# Patient Record
Sex: MALE | Race: OTHER | NOT HISPANIC OR LATINO | Employment: OTHER | URBAN - METROPOLITAN AREA
[De-identification: names, ages, dates, MRNs, and addresses within clinical notes are randomized per-mention and may not be internally consistent; named-entity substitution may affect disease eponyms.]

---

## 2023-05-30 LAB
ALANINE AMINOTRANSFERASE (SGPT) (U/L) IN SER/PLAS: 13 U/L (ref 10–52)
ALBUMIN (G/DL) IN SER/PLAS: 4.2 G/DL (ref 3.4–5)
ALKALINE PHOSPHATASE (U/L) IN SER/PLAS: 70 U/L (ref 33–136)
ANION GAP IN SER/PLAS: 13 MMOL/L (ref 10–20)
APPEARANCE, URINE: CLEAR
ASPARTATE AMINOTRANSFERASE (SGOT) (U/L) IN SER/PLAS: 14 U/L (ref 9–39)
BASOPHILS (10*3/UL) IN BLOOD BY AUTOMATED COUNT: 0.1 X10E9/L (ref 0–0.1)
BASOPHILS/100 LEUKOCYTES IN BLOOD BY AUTOMATED COUNT: 1.4 % (ref 0–2)
BILIRUBIN TOTAL (MG/DL) IN SER/PLAS: 0.4 MG/DL (ref 0–1.2)
BILIRUBIN, URINE: NEGATIVE
BLOOD, URINE: NEGATIVE
CALCIUM (MG/DL) IN SER/PLAS: 8.9 MG/DL (ref 8.6–10.3)
CARBON DIOXIDE, TOTAL (MMOL/L) IN SER/PLAS: 27 MMOL/L (ref 21–32)
CHLORIDE (MMOL/L) IN SER/PLAS: 101 MMOL/L (ref 98–107)
CHOLESTEROL (MG/DL) IN SER/PLAS: 190 MG/DL (ref 0–199)
CHOLESTEROL IN HDL (MG/DL) IN SER/PLAS: 44 MG/DL
CHOLESTEROL/HDL RATIO: 4.3
COLOR, URINE: YELLOW
CREATININE (MG/DL) IN SER/PLAS: 0.89 MG/DL (ref 0.5–1.3)
EOSINOPHILS (10*3/UL) IN BLOOD BY AUTOMATED COUNT: 0.49 X10E9/L (ref 0–0.4)
EOSINOPHILS/100 LEUKOCYTES IN BLOOD BY AUTOMATED COUNT: 6.7 % (ref 0–6)
ERYTHROCYTE DISTRIBUTION WIDTH (RATIO) BY AUTOMATED COUNT: 16.7 % (ref 11.5–14.5)
ERYTHROCYTE MEAN CORPUSCULAR HEMOGLOBIN CONCENTRATION (G/DL) BY AUTOMATED: 29.2 G/DL (ref 32–36)
ERYTHROCYTE MEAN CORPUSCULAR VOLUME (FL) BY AUTOMATED COUNT: 72 FL (ref 80–100)
ERYTHROCYTES (10*6/UL) IN BLOOD BY AUTOMATED COUNT: 5.77 X10E12/L (ref 4.5–5.9)
ESTIMATED AVERAGE GLUCOSE FOR HBA1C: 126 MG/DL
GFR MALE: 86 ML/MIN/1.73M2
GLUCOSE (MG/DL) IN SER/PLAS: 125 MG/DL (ref 74–99)
GLUCOSE, URINE: NEGATIVE MG/DL
HEMATOCRIT (%) IN BLOOD BY AUTOMATED COUNT: 41.4 % (ref 41–52)
HEMOGLOBIN (G/DL) IN BLOOD: 12.1 G/DL (ref 13.5–17.5)
HEMOGLOBIN A1C/HEMOGLOBIN TOTAL IN BLOOD: 6 %
IMMATURE GRANULOCYTES/100 LEUKOCYTES IN BLOOD BY AUTOMATED COUNT: 0.4 % (ref 0–0.9)
KETONES, URINE: NEGATIVE MG/DL
LDL: 100 MG/DL (ref 0–99)
LEUKOCYTE ESTERASE, URINE: NEGATIVE
LEUKOCYTES (10*3/UL) IN BLOOD BY AUTOMATED COUNT: 7.3 X10E9/L (ref 4.4–11.3)
LYMPHOCYTES (10*3/UL) IN BLOOD BY AUTOMATED COUNT: 2.12 X10E9/L (ref 0.8–3)
LYMPHOCYTES/100 LEUKOCYTES IN BLOOD BY AUTOMATED COUNT: 29 % (ref 13–44)
MONOCYTES (10*3/UL) IN BLOOD BY AUTOMATED COUNT: 1 X10E9/L (ref 0.05–0.8)
MONOCYTES/100 LEUKOCYTES IN BLOOD BY AUTOMATED COUNT: 13.7 % (ref 2–10)
NEUTROPHILS (10*3/UL) IN BLOOD BY AUTOMATED COUNT: 3.56 X10E9/L (ref 1.6–5.5)
NEUTROPHILS/100 LEUKOCYTES IN BLOOD BY AUTOMATED COUNT: 48.8 % (ref 40–80)
NITRITE, URINE: NEGATIVE
NON HDL CHOLESTEROL: 146 MG/DL
NRBC (PER 100 WBCS) BY AUTOMATED COUNT: 0 /100 WBC (ref 0–0)
PH, URINE: 6 (ref 5–8)
PLATELETS (10*3/UL) IN BLOOD AUTOMATED COUNT: 190 X10E9/L (ref 150–450)
POTASSIUM (MMOL/L) IN SER/PLAS: 4.2 MMOL/L (ref 3.5–5.3)
PROTEIN TOTAL: 7.1 G/DL (ref 6.4–8.2)
PROTEIN, URINE: NEGATIVE MG/DL
SODIUM (MMOL/L) IN SER/PLAS: 137 MMOL/L (ref 136–145)
SPECIFIC GRAVITY, URINE: 1.02 (ref 1–1.03)
THYROTROPIN (MIU/L) IN SER/PLAS BY DETECTION LIMIT <= 0.05 MIU/L: 4.93 MIU/L (ref 0.44–3.98)
TRIGLYCERIDE (MG/DL) IN SER/PLAS: 229 MG/DL (ref 0–149)
URATE (MG/DL) IN SER/PLAS: 5.4 MG/DL (ref 4–7.5)
UREA NITROGEN (MG/DL) IN SER/PLAS: 15 MG/DL (ref 6–23)
UROBILINOGEN, URINE: <2 MG/DL (ref 0–1.9)
VLDL: 46 MG/DL (ref 0–40)

## 2023-09-07 LAB
ALANINE AMINOTRANSFERASE (SGPT) (U/L) IN SER/PLAS: 12 U/L (ref 10–52)
ALBUMIN (G/DL) IN SER/PLAS: 4.5 G/DL (ref 3.4–5)
ALKALINE PHOSPHATASE (U/L) IN SER/PLAS: 73 U/L (ref 33–136)
ANION GAP IN SER/PLAS: 8 MMOL/L (ref 10–20)
ASPARTATE AMINOTRANSFERASE (SGOT) (U/L) IN SER/PLAS: 17 U/L (ref 9–39)
BASOPHILS (10*3/UL) IN BLOOD BY AUTOMATED COUNT: 0.08 X10E9/L (ref 0–0.1)
BASOPHILS/100 LEUKOCYTES IN BLOOD BY AUTOMATED COUNT: 0.9 % (ref 0–2)
BILIRUBIN TOTAL (MG/DL) IN SER/PLAS: 0.5 MG/DL (ref 0–1.2)
CALCIUM (MG/DL) IN SER/PLAS: 9.5 MG/DL (ref 8.6–10.3)
CARBON DIOXIDE, TOTAL (MMOL/L) IN SER/PLAS: 32 MMOL/L (ref 21–32)
CHLORIDE (MMOL/L) IN SER/PLAS: 100 MMOL/L (ref 98–107)
CREATININE (MG/DL) IN SER/PLAS: 1.04 MG/DL (ref 0.5–1.3)
EOSINOPHILS (10*3/UL) IN BLOOD BY AUTOMATED COUNT: 0.56 X10E9/L (ref 0–0.4)
EOSINOPHILS/100 LEUKOCYTES IN BLOOD BY AUTOMATED COUNT: 6.5 % (ref 0–6)
ERYTHROCYTE DISTRIBUTION WIDTH (RATIO) BY AUTOMATED COUNT: 15.6 % (ref 11.5–14.5)
ERYTHROCYTE MEAN CORPUSCULAR HEMOGLOBIN CONCENTRATION (G/DL) BY AUTOMATED: 31 G/DL (ref 32–36)
ERYTHROCYTE MEAN CORPUSCULAR VOLUME (FL) BY AUTOMATED COUNT: 70 FL (ref 80–100)
ERYTHROCYTES (10*6/UL) IN BLOOD BY AUTOMATED COUNT: 5.76 X10E12/L (ref 4.5–5.9)
GFR MALE: 72 ML/MIN/1.73M2
GLUCOSE (MG/DL) IN SER/PLAS: 91 MG/DL (ref 74–99)
HEMATOCRIT (%) IN BLOOD BY AUTOMATED COUNT: 40.6 % (ref 41–52)
HEMOGLOBIN (G/DL) IN BLOOD: 12.6 G/DL (ref 13.5–17.5)
IMMATURE GRANULOCYTES/100 LEUKOCYTES IN BLOOD BY AUTOMATED COUNT: 0.4 % (ref 0–0.9)
LEUKOCYTES (10*3/UL) IN BLOOD BY AUTOMATED COUNT: 8.6 X10E9/L (ref 4.4–11.3)
LYMPHOCYTES (10*3/UL) IN BLOOD BY AUTOMATED COUNT: 2.08 X10E9/L (ref 0.8–3)
LYMPHOCYTES/100 LEUKOCYTES IN BLOOD BY AUTOMATED COUNT: 24.3 % (ref 13–44)
MONOCYTES (10*3/UL) IN BLOOD BY AUTOMATED COUNT: 0.79 X10E9/L (ref 0.05–0.8)
MONOCYTES/100 LEUKOCYTES IN BLOOD BY AUTOMATED COUNT: 9.2 % (ref 2–10)
NEUTROPHILS (10*3/UL) IN BLOOD BY AUTOMATED COUNT: 5.02 X10E9/L (ref 1.6–5.5)
NEUTROPHILS/100 LEUKOCYTES IN BLOOD BY AUTOMATED COUNT: 58.7 % (ref 40–80)
NRBC (PER 100 WBCS) BY AUTOMATED COUNT: 0 /100 WBC (ref 0–0)
PLATELETS (10*3/UL) IN BLOOD AUTOMATED COUNT: 225 X10E9/L (ref 150–450)
POTASSIUM (MMOL/L) IN SER/PLAS: 4.6 MMOL/L (ref 3.5–5.3)
PROTEIN TOTAL: 7.7 G/DL (ref 6.4–8.2)
SODIUM (MMOL/L) IN SER/PLAS: 135 MMOL/L (ref 136–145)
UREA NITROGEN (MG/DL) IN SER/PLAS: 16 MG/DL (ref 6–23)

## 2023-12-05 ENCOUNTER — LAB (OUTPATIENT)
Dept: LAB | Facility: LAB | Age: 83
End: 2023-12-05
Payer: MEDICARE

## 2023-12-05 DIAGNOSIS — D64.9 ANEMIA, UNSPECIFIED: ICD-10-CM

## 2023-12-05 DIAGNOSIS — Z00.00 ENCOUNTER FOR GENERAL ADULT MEDICAL EXAMINATION WITHOUT ABNORMAL FINDINGS: ICD-10-CM

## 2023-12-05 DIAGNOSIS — E03.8 OTHER SPECIFIED HYPOTHYROIDISM: Primary | ICD-10-CM

## 2023-12-05 DIAGNOSIS — R73.9 HYPERGLYCEMIA, UNSPECIFIED: ICD-10-CM

## 2023-12-05 LAB
ALBUMIN SERPL BCP-MCNC: 4.1 G/DL (ref 3.4–5)
ALP SERPL-CCNC: 73 U/L (ref 33–136)
ALT SERPL W P-5'-P-CCNC: 11 U/L (ref 10–52)
ANION GAP SERPL CALC-SCNC: 12 MMOL/L (ref 10–20)
APPEARANCE UR: CLEAR
AST SERPL W P-5'-P-CCNC: 15 U/L (ref 9–39)
BASOPHILS # BLD AUTO: 0.08 X10*3/UL (ref 0–0.1)
BASOPHILS NFR BLD AUTO: 1.1 %
BILIRUB SERPL-MCNC: 0.5 MG/DL (ref 0–1.2)
BILIRUB UR STRIP.AUTO-MCNC: NEGATIVE MG/DL
BUN SERPL-MCNC: 14 MG/DL (ref 6–23)
CALCIUM SERPL-MCNC: 9.4 MG/DL (ref 8.6–10.3)
CHLORIDE SERPL-SCNC: 100 MMOL/L (ref 98–107)
CHOLEST SERPL-MCNC: 197 MG/DL (ref 0–199)
CHOLESTEROL/HDL RATIO: 4.8
CO2 SERPL-SCNC: 28 MMOL/L (ref 21–32)
COLOR UR: YELLOW
CREAT SERPL-MCNC: 1.14 MG/DL (ref 0.5–1.3)
EOSINOPHIL # BLD AUTO: 0.45 X10*3/UL (ref 0–0.4)
EOSINOPHIL NFR BLD AUTO: 6.4 %
ERYTHROCYTE [DISTWIDTH] IN BLOOD BY AUTOMATED COUNT: 15.8 % (ref 11.5–14.5)
EST. AVERAGE GLUCOSE BLD GHB EST-MCNC: 137 MG/DL
GFR SERPL CREATININE-BSD FRML MDRD: 65 ML/MIN/1.73M*2
GLUCOSE SERPL-MCNC: 113 MG/DL (ref 74–99)
GLUCOSE UR STRIP.AUTO-MCNC: NEGATIVE MG/DL
HBA1C MFR BLD: 6.4 %
HCT VFR BLD AUTO: 40.2 % (ref 41–52)
HDLC SERPL-MCNC: 40.9 MG/DL
HGB BLD-MCNC: 12.4 G/DL (ref 13.5–17.5)
IMM GRANULOCYTES # BLD AUTO: 0.03 X10*3/UL (ref 0–0.5)
IMM GRANULOCYTES NFR BLD AUTO: 0.4 % (ref 0–0.9)
KETONES UR STRIP.AUTO-MCNC: NEGATIVE MG/DL
LDLC SERPL CALC-MCNC: 120 MG/DL
LEUKOCYTE ESTERASE UR QL STRIP.AUTO: NEGATIVE
LYMPHOCYTES # BLD AUTO: 2.13 X10*3/UL (ref 0.8–3)
LYMPHOCYTES NFR BLD AUTO: 30.5 %
MCH RBC QN AUTO: 21.9 PG (ref 26–34)
MCHC RBC AUTO-ENTMCNC: 30.8 G/DL (ref 32–36)
MCV RBC AUTO: 71 FL (ref 80–100)
MONOCYTES # BLD AUTO: 0.88 X10*3/UL (ref 0.05–0.8)
MONOCYTES NFR BLD AUTO: 12.6 %
NEUTROPHILS # BLD AUTO: 3.42 X10*3/UL (ref 1.6–5.5)
NEUTROPHILS NFR BLD AUTO: 49 %
NITRITE UR QL STRIP.AUTO: NEGATIVE
NON HDL CHOLESTEROL: 156 MG/DL (ref 0–149)
NRBC BLD-RTO: 0 /100 WBCS (ref 0–0)
PH UR STRIP.AUTO: 6 [PH]
PLATELET # BLD AUTO: 207 X10*3/UL (ref 150–450)
POTASSIUM SERPL-SCNC: 4.4 MMOL/L (ref 3.5–5.3)
PROT SERPL-MCNC: 7 G/DL (ref 6.4–8.2)
PROT UR STRIP.AUTO-MCNC: NEGATIVE MG/DL
PSA SERPL-MCNC: 1.3 NG/ML
RBC # BLD AUTO: 5.67 X10*6/UL (ref 4.5–5.9)
RBC # UR STRIP.AUTO: NEGATIVE /UL
SODIUM SERPL-SCNC: 136 MMOL/L (ref 136–145)
SP GR UR STRIP.AUTO: 1.02
TRIGL SERPL-MCNC: 183 MG/DL (ref 0–149)
TSH SERPL-ACNC: 5.28 MIU/L (ref 0.44–3.98)
UROBILINOGEN UR STRIP.AUTO-MCNC: <2 MG/DL
VLDL: 37 MG/DL (ref 0–40)
WBC # BLD AUTO: 7 X10*3/UL (ref 4.4–11.3)

## 2023-12-05 PROCEDURE — 83036 HEMOGLOBIN GLYCOSYLATED A1C: CPT

## 2023-12-05 PROCEDURE — 80061 LIPID PANEL: CPT

## 2023-12-05 PROCEDURE — 36415 COLL VENOUS BLD VENIPUNCTURE: CPT

## 2023-12-05 PROCEDURE — 85025 COMPLETE CBC W/AUTO DIFF WBC: CPT

## 2023-12-05 PROCEDURE — 81003 URINALYSIS AUTO W/O SCOPE: CPT

## 2023-12-05 PROCEDURE — 80053 COMPREHEN METABOLIC PANEL: CPT

## 2023-12-05 PROCEDURE — 84443 ASSAY THYROID STIM HORMONE: CPT

## 2023-12-05 PROCEDURE — 84153 ASSAY OF PSA TOTAL: CPT

## 2024-02-26 ENCOUNTER — APPOINTMENT (OUTPATIENT)
Dept: RADIOLOGY | Facility: HOSPITAL | Age: 84
End: 2024-02-26
Payer: MEDICARE

## 2024-02-26 ENCOUNTER — HOSPITAL ENCOUNTER (OUTPATIENT)
Facility: HOSPITAL | Age: 84
Setting detail: OBSERVATION
Discharge: HOME | End: 2024-02-27
Attending: EMERGENCY MEDICINE
Payer: MEDICARE

## 2024-02-26 ENCOUNTER — APPOINTMENT (OUTPATIENT)
Dept: CARDIOLOGY | Facility: HOSPITAL | Age: 84
End: 2024-02-26
Payer: MEDICARE

## 2024-02-26 DIAGNOSIS — H81.22 VESTIBULAR NEURITIS, LEFT: ICD-10-CM

## 2024-02-26 DIAGNOSIS — H81.20 VESTIBULAR NEURONITIS, UNSPECIFIED LATERALITY: ICD-10-CM

## 2024-02-26 DIAGNOSIS — R42 DIZZINESS: Primary | ICD-10-CM

## 2024-02-26 LAB
ALBUMIN SERPL BCP-MCNC: 4.1 G/DL (ref 3.4–5)
ALP SERPL-CCNC: 62 U/L (ref 33–136)
ALT SERPL W P-5'-P-CCNC: 13 U/L (ref 10–52)
ANION GAP SERPL CALC-SCNC: 10 MMOL/L (ref 10–20)
AST SERPL W P-5'-P-CCNC: 16 U/L (ref 9–39)
BASOPHILS # BLD AUTO: 0.09 X10*3/UL (ref 0–0.1)
BASOPHILS NFR BLD AUTO: 0.8 %
BILIRUB SERPL-MCNC: 0.4 MG/DL (ref 0–1.2)
BUN SERPL-MCNC: 15 MG/DL (ref 6–23)
CALCIUM SERPL-MCNC: 8.9 MG/DL (ref 8.6–10.3)
CARDIAC TROPONIN I PNL SERPL HS: 3 NG/L (ref 0–20)
CHLORIDE SERPL-SCNC: 103 MMOL/L (ref 98–107)
CO2 SERPL-SCNC: 29 MMOL/L (ref 21–32)
CREAT SERPL-MCNC: 0.92 MG/DL (ref 0.5–1.3)
EGFRCR SERPLBLD CKD-EPI 2021: 84 ML/MIN/1.73M*2
EOSINOPHIL # BLD AUTO: 0.43 X10*3/UL (ref 0–0.4)
EOSINOPHIL NFR BLD AUTO: 3.7 %
ERYTHROCYTE [DISTWIDTH] IN BLOOD BY AUTOMATED COUNT: 16.1 % (ref 11.5–14.5)
GLUCOSE SERPL-MCNC: 136 MG/DL (ref 74–99)
HCT VFR BLD AUTO: 39.4 % (ref 41–52)
HGB BLD-MCNC: 12.4 G/DL (ref 13.5–17.5)
IMM GRANULOCYTES # BLD AUTO: 0.05 X10*3/UL (ref 0–0.5)
IMM GRANULOCYTES NFR BLD AUTO: 0.4 % (ref 0–0.9)
LYMPHOCYTES # BLD AUTO: 2.08 X10*3/UL (ref 0.8–3)
LYMPHOCYTES NFR BLD AUTO: 18.1 %
MCH RBC QN AUTO: 22.1 PG (ref 26–34)
MCHC RBC AUTO-ENTMCNC: 31.5 G/DL (ref 32–36)
MCV RBC AUTO: 70 FL (ref 80–100)
MONOCYTES # BLD AUTO: 0.92 X10*3/UL (ref 0.05–0.8)
MONOCYTES NFR BLD AUTO: 8 %
NEUTROPHILS # BLD AUTO: 7.92 X10*3/UL (ref 1.6–5.5)
NEUTROPHILS NFR BLD AUTO: 69 %
NRBC BLD-RTO: 0 /100 WBCS (ref 0–0)
PLATELET # BLD AUTO: 170 X10*3/UL (ref 150–450)
POTASSIUM SERPL-SCNC: 3.5 MMOL/L (ref 3.5–5.3)
PROT SERPL-MCNC: 6.9 G/DL (ref 6.4–8.2)
RBC # BLD AUTO: 5.6 X10*6/UL (ref 4.5–5.9)
SODIUM SERPL-SCNC: 138 MMOL/L (ref 136–145)
WBC # BLD AUTO: 11.5 X10*3/UL (ref 4.4–11.3)

## 2024-02-26 PROCEDURE — G0378 HOSPITAL OBSERVATION PER HR: HCPCS

## 2024-02-26 PROCEDURE — 97165 OT EVAL LOW COMPLEX 30 MIN: CPT | Mod: GO

## 2024-02-26 PROCEDURE — 2550000001 HC RX 255 CONTRASTS: Performed by: EMERGENCY MEDICINE

## 2024-02-26 PROCEDURE — 2500000001 HC RX 250 WO HCPCS SELF ADMINISTERED DRUGS (ALT 637 FOR MEDICARE OP)

## 2024-02-26 PROCEDURE — 2500000004 HC RX 250 GENERAL PHARMACY W/ HCPCS (ALT 636 FOR OP/ED)

## 2024-02-26 PROCEDURE — 84484 ASSAY OF TROPONIN QUANT: CPT | Performed by: PHYSICIAN ASSISTANT

## 2024-02-26 PROCEDURE — 70551 MRI BRAIN STEM W/O DYE: CPT | Performed by: RADIOLOGY

## 2024-02-26 PROCEDURE — 2500000001 HC RX 250 WO HCPCS SELF ADMINISTERED DRUGS (ALT 637 FOR MEDICARE OP): Performed by: INTERNAL MEDICINE

## 2024-02-26 PROCEDURE — 70498 CT ANGIOGRAPHY NECK: CPT | Performed by: RADIOLOGY

## 2024-02-26 PROCEDURE — 99222 1ST HOSP IP/OBS MODERATE 55: CPT

## 2024-02-26 PROCEDURE — 96361 HYDRATE IV INFUSION ADD-ON: CPT | Mod: 59 | Performed by: EMERGENCY MEDICINE

## 2024-02-26 PROCEDURE — 36415 COLL VENOUS BLD VENIPUNCTURE: CPT | Performed by: PHYSICIAN ASSISTANT

## 2024-02-26 PROCEDURE — 70450 CT HEAD/BRAIN W/O DYE: CPT

## 2024-02-26 PROCEDURE — 99223 1ST HOSP IP/OBS HIGH 75: CPT | Performed by: STUDENT IN AN ORGANIZED HEALTH CARE EDUCATION/TRAINING PROGRAM

## 2024-02-26 PROCEDURE — 70496 CT ANGIOGRAPHY HEAD: CPT | Performed by: RADIOLOGY

## 2024-02-26 PROCEDURE — 70551 MRI BRAIN STEM W/O DYE: CPT

## 2024-02-26 PROCEDURE — 99285 EMERGENCY DEPT VISIT HI MDM: CPT | Mod: 25

## 2024-02-26 PROCEDURE — 2500000004 HC RX 250 GENERAL PHARMACY W/ HCPCS (ALT 636 FOR OP/ED): Performed by: PHYSICIAN ASSISTANT

## 2024-02-26 PROCEDURE — 97161 PT EVAL LOW COMPLEX 20 MIN: CPT | Mod: GP

## 2024-02-26 PROCEDURE — 70498 CT ANGIOGRAPHY NECK: CPT

## 2024-02-26 PROCEDURE — 2500000002 HC RX 250 W HCPCS SELF ADMINISTERED DRUGS (ALT 637 FOR MEDICARE OP, ALT 636 FOR OP/ED): Mod: MUE | Performed by: INTERNAL MEDICINE

## 2024-02-26 PROCEDURE — 93005 ELECTROCARDIOGRAM TRACING: CPT

## 2024-02-26 PROCEDURE — 85025 COMPLETE CBC W/AUTO DIFF WBC: CPT | Performed by: PHYSICIAN ASSISTANT

## 2024-02-26 PROCEDURE — 80053 COMPREHEN METABOLIC PANEL: CPT | Performed by: PHYSICIAN ASSISTANT

## 2024-02-26 PROCEDURE — 96374 THER/PROPH/DIAG INJ IV PUSH: CPT | Mod: 59 | Performed by: EMERGENCY MEDICINE

## 2024-02-26 PROCEDURE — 96372 THER/PROPH/DIAG INJ SC/IM: CPT

## 2024-02-26 RX ORDER — ALLOPURINOL 100 MG/1
100 TABLET ORAL NIGHTLY
Status: DISCONTINUED | OUTPATIENT
Start: 2024-02-26 | End: 2024-02-27 | Stop reason: HOSPADM

## 2024-02-26 RX ORDER — LEVOTHYROXINE SODIUM 75 UG/1
75 TABLET ORAL
COMMUNITY
Start: 2003-10-29

## 2024-02-26 RX ORDER — ALBUTEROL SULFATE 90 UG/1
1 AEROSOL, METERED RESPIRATORY (INHALATION) EVERY 4 HOURS PRN
COMMUNITY
Start: 2023-12-11

## 2024-02-26 RX ORDER — PNV NO.95/FERROUS FUM/FOLIC AC 28MG-0.8MG
1000 TABLET ORAL DAILY
Status: DISCONTINUED | OUTPATIENT
Start: 2024-02-26 | End: 2024-02-27 | Stop reason: HOSPADM

## 2024-02-26 RX ORDER — DOCUSATE SODIUM 100 MG/1
100 CAPSULE, LIQUID FILLED ORAL 2 TIMES DAILY
Status: DISCONTINUED | OUTPATIENT
Start: 2024-02-26 | End: 2024-02-27 | Stop reason: HOSPADM

## 2024-02-26 RX ORDER — MONTELUKAST SODIUM 10 MG/1
10 TABLET ORAL NIGHTLY
COMMUNITY
Start: 2023-11-16

## 2024-02-26 RX ORDER — ASPIRIN 81 MG/1
81 TABLET ORAL DAILY
Status: DISCONTINUED | OUTPATIENT
Start: 2024-02-26 | End: 2024-02-27 | Stop reason: HOSPADM

## 2024-02-26 RX ORDER — MECLIZINE HYDROCHLORIDE 25 MG/1
25 TABLET ORAL ONCE
Status: COMPLETED | OUTPATIENT
Start: 2024-02-26 | End: 2024-02-26

## 2024-02-26 RX ORDER — ACETAMINOPHEN 325 MG/1
650 TABLET ORAL EVERY 4 HOURS PRN
Status: DISCONTINUED | OUTPATIENT
Start: 2024-02-26 | End: 2024-02-27 | Stop reason: HOSPADM

## 2024-02-26 RX ORDER — PRIMIDONE 50 MG/1
50 TABLET ORAL 2 TIMES DAILY
Status: DISCONTINUED | OUTPATIENT
Start: 2024-02-26 | End: 2024-02-27 | Stop reason: HOSPADM

## 2024-02-26 RX ORDER — PRIMIDONE 50 MG/1
1 TABLET ORAL 2 TIMES DAILY
COMMUNITY
Start: 2020-01-08

## 2024-02-26 RX ORDER — BISACODYL 5 MG
10 TABLET, DELAYED RELEASE (ENTERIC COATED) ORAL ONCE
Status: COMPLETED | OUTPATIENT
Start: 2024-02-26 | End: 2024-02-26

## 2024-02-26 RX ORDER — ENOXAPARIN SODIUM 100 MG/ML
40 INJECTION SUBCUTANEOUS EVERY 24 HOURS
Status: DISCONTINUED | OUTPATIENT
Start: 2024-02-26 | End: 2024-02-27 | Stop reason: HOSPADM

## 2024-02-26 RX ORDER — ICOSAPENT ETHYL 1 G/1
1000 CAPSULE ORAL DAILY
Status: DISCONTINUED | OUTPATIENT
Start: 2024-02-26 | End: 2024-02-27 | Stop reason: HOSPADM

## 2024-02-26 RX ORDER — CEPHALEXIN 250 MG/1
1 CAPSULE ORAL NIGHTLY
COMMUNITY
Start: 2003-12-23

## 2024-02-26 RX ORDER — MAGNESIUM 200 MG
TABLET ORAL DAILY
COMMUNITY

## 2024-02-26 RX ORDER — DIAZEPAM 5 MG/ML
2 INJECTION, SOLUTION INTRAMUSCULAR; INTRAVENOUS ONCE
Status: COMPLETED | OUTPATIENT
Start: 2024-02-26 | End: 2024-02-26

## 2024-02-26 RX ORDER — CHOLECALCIFEROL (VITAMIN D3) 25 MCG
1000 TABLET ORAL DAILY
Status: DISCONTINUED | OUTPATIENT
Start: 2024-02-26 | End: 2024-02-27 | Stop reason: HOSPADM

## 2024-02-26 RX ORDER — FLUTICASONE FUROATE AND VILANTEROL 100; 25 UG/1; UG/1
1 POWDER RESPIRATORY (INHALATION)
Status: DISCONTINUED | OUTPATIENT
Start: 2024-02-27 | End: 2024-02-27 | Stop reason: HOSPADM

## 2024-02-26 RX ORDER — MULTIVIT-MIN/IRON FUM/FOLIC AC 7.5 MG-4
1 TABLET ORAL DAILY
Status: DISCONTINUED | OUTPATIENT
Start: 2024-02-26 | End: 2024-02-27 | Stop reason: HOSPADM

## 2024-02-26 RX ORDER — MECLIZINE HYDROCHLORIDE 25 MG/1
25 TABLET ORAL 3 TIMES DAILY PRN
Status: DISCONTINUED | OUTPATIENT
Start: 2024-02-26 | End: 2024-02-27 | Stop reason: HOSPADM

## 2024-02-26 RX ORDER — ATORVASTATIN CALCIUM 20 MG/1
20 TABLET, FILM COATED ORAL NIGHTLY
Status: DISCONTINUED | OUTPATIENT
Start: 2024-02-26 | End: 2024-02-27 | Stop reason: HOSPADM

## 2024-02-26 RX ORDER — ALLOPURINOL 100 MG/1
100 TABLET ORAL NIGHTLY
COMMUNITY
Start: 2016-04-02

## 2024-02-26 RX ORDER — VIT C/E/ZN/COPPR/LUTEIN/ZEAXAN 250MG-90MG
50 CAPSULE ORAL DAILY
COMMUNITY

## 2024-02-26 RX ORDER — LEVOTHYROXINE SODIUM 150 UG/1
150 TABLET ORAL
Status: DISCONTINUED | OUTPATIENT
Start: 2024-03-02 | End: 2024-02-27 | Stop reason: HOSPADM

## 2024-02-26 RX ORDER — ALBUTEROL SULFATE 90 UG/1
1 AEROSOL, METERED RESPIRATORY (INHALATION) EVERY 4 HOURS PRN
Status: DISCONTINUED | OUTPATIENT
Start: 2024-02-26 | End: 2024-02-27 | Stop reason: HOSPADM

## 2024-02-26 RX ORDER — AMLODIPINE BESYLATE 5 MG/1
5 TABLET ORAL DAILY
Status: DISCONTINUED | OUTPATIENT
Start: 2024-02-26 | End: 2024-02-27 | Stop reason: HOSPADM

## 2024-02-26 RX ORDER — ACETAMINOPHEN 500 MG
1000 TABLET ORAL DAILY
COMMUNITY

## 2024-02-26 RX ORDER — MONTELUKAST SODIUM 10 MG/1
10 TABLET ORAL NIGHTLY
Status: DISCONTINUED | OUTPATIENT
Start: 2024-02-26 | End: 2024-02-27 | Stop reason: HOSPADM

## 2024-02-26 RX ORDER — POLYETHYLENE GLYCOL 3350 17 G/17G
17 POWDER, FOR SOLUTION ORAL DAILY
Status: DISCONTINUED | OUTPATIENT
Start: 2024-02-26 | End: 2024-02-27 | Stop reason: HOSPADM

## 2024-02-26 RX ORDER — LEVOTHYROXINE SODIUM 75 UG/1
75 TABLET ORAL
Status: DISCONTINUED | OUTPATIENT
Start: 2024-02-27 | End: 2024-02-27 | Stop reason: HOSPADM

## 2024-02-26 RX ORDER — MULTIVITAMIN
1 TABLET ORAL DAILY
COMMUNITY

## 2024-02-26 RX ADMIN — PRIMIDONE 50 MG: 50 TABLET ORAL at 21:18

## 2024-02-26 RX ADMIN — DOCUSATE SODIUM 100 MG: 100 CAPSULE, LIQUID FILLED ORAL at 09:48

## 2024-02-26 RX ADMIN — ALLOPURINOL 100 MG: 100 TABLET ORAL at 21:18

## 2024-02-26 RX ADMIN — POLYETHYLENE GLYCOL 3350 17 G: 17 POWDER, FOR SOLUTION ORAL at 09:48

## 2024-02-26 RX ADMIN — BISACODYL 10 MG: 5 TABLET, COATED ORAL at 05:20

## 2024-02-26 RX ADMIN — ENOXAPARIN SODIUM 40 MG: 40 INJECTION SUBCUTANEOUS at 05:20

## 2024-02-26 RX ADMIN — AMLODIPINE BESYLATE 5 MG: 5 TABLET ORAL at 21:17

## 2024-02-26 RX ADMIN — DIAZEPAM 2 MG: 10 INJECTION, SOLUTION INTRAMUSCULAR; INTRAVENOUS at 01:57

## 2024-02-26 RX ADMIN — MECLIZINE HYDROCHLORIDE 25 MG: 25 TABLET ORAL at 21:31

## 2024-02-26 RX ADMIN — MECLIZINE HYDROCHLORIDE 25 MG: 25 TABLET ORAL at 05:20

## 2024-02-26 RX ADMIN — IOHEXOL 90 ML: 350 INJECTION, SOLUTION INTRAVENOUS at 02:49

## 2024-02-26 RX ADMIN — ATORVASTATIN CALCIUM 20 MG: 20 TABLET, FILM COATED ORAL at 21:18

## 2024-02-26 RX ADMIN — SODIUM CHLORIDE 500 ML: 9 INJECTION, SOLUTION INTRAVENOUS at 01:57

## 2024-02-26 SDOH — SOCIAL STABILITY: SOCIAL INSECURITY: ARE THERE ANY APPARENT SIGNS OF INJURIES/BEHAVIORS THAT COULD BE RELATED TO ABUSE/NEGLECT?: NO

## 2024-02-26 SDOH — SOCIAL STABILITY: SOCIAL INSECURITY: DO YOU FEEL ANYONE HAS EXPLOITED OR TAKEN ADVANTAGE OF YOU FINANCIALLY OR OF YOUR PERSONAL PROPERTY?: NO

## 2024-02-26 SDOH — SOCIAL STABILITY: SOCIAL INSECURITY: WERE YOU ABLE TO COMPLETE ALL THE BEHAVIORAL HEALTH SCREENINGS?: YES

## 2024-02-26 SDOH — SOCIAL STABILITY: SOCIAL INSECURITY: ARE YOU OR HAVE YOU BEEN THREATENED OR ABUSED PHYSICALLY, EMOTIONALLY, OR SEXUALLY BY ANYONE?: NO

## 2024-02-26 SDOH — SOCIAL STABILITY: SOCIAL INSECURITY: HAVE YOU HAD THOUGHTS OF HARMING ANYONE ELSE?: NO

## 2024-02-26 SDOH — SOCIAL STABILITY: SOCIAL INSECURITY: HAS ANYONE EVER THREATENED TO HURT YOUR FAMILY OR YOUR PETS?: NO

## 2024-02-26 SDOH — SOCIAL STABILITY: SOCIAL INSECURITY: DO YOU FEEL UNSAFE GOING BACK TO THE PLACE WHERE YOU ARE LIVING?: NO

## 2024-02-26 SDOH — SOCIAL STABILITY: SOCIAL INSECURITY: DOES ANYONE TRY TO KEEP YOU FROM HAVING/CONTACTING OTHER FRIENDS OR DOING THINGS OUTSIDE YOUR HOME?: NO

## 2024-02-26 SDOH — SOCIAL STABILITY: SOCIAL INSECURITY: ABUSE: ADULT

## 2024-02-26 ASSESSMENT — ACTIVITIES OF DAILY LIVING (ADL)
BATHING: INDEPENDENT
PATIENT'S MEMORY ADEQUATE TO SAFELY COMPLETE DAILY ACTIVITIES?: YES
HEARING - LEFT EAR: FUNCTIONAL
HEARING - RIGHT EAR: FUNCTIONAL
FEEDING YOURSELF: INDEPENDENT
ADEQUATE_TO_COMPLETE_ADL: YES
JUDGMENT_ADEQUATE_SAFELY_COMPLETE_DAILY_ACTIVITIES: YES
WALKS IN HOME: INDEPENDENT
LACK_OF_TRANSPORTATION: NO
DRESSING YOURSELF: INDEPENDENT
GROOMING: INDEPENDENT
TOILETING: INDEPENDENT

## 2024-02-26 ASSESSMENT — COGNITIVE AND FUNCTIONAL STATUS - GENERAL
CLIMB 3 TO 5 STEPS WITH RAILING: A LITTLE
WALKING IN HOSPITAL ROOM: A LITTLE
PERSONAL GROOMING: A LITTLE
PATIENT BASELINE BEDBOUND: NO
TOILETING: A LOT
CLIMB 3 TO 5 STEPS WITH RAILING: A LITTLE
STANDING UP FROM CHAIR USING ARMS: A LITTLE
DAILY ACTIVITIY SCORE: 22
STANDING UP FROM CHAIR USING ARMS: A LITTLE
HELP NEEDED FOR BATHING: A LITTLE
MOVING TO AND FROM BED TO CHAIR: A LITTLE
DAILY ACTIVITIY SCORE: 17
MOBILITY SCORE: 20
WALKING IN HOSPITAL ROOM: A LITTLE
HELP NEEDED FOR BATHING: A LOT
MOVING TO AND FROM BED TO CHAIR: A LITTLE
TOILETING: A LITTLE
MOBILITY SCORE: 20
DRESSING REGULAR LOWER BODY CLOTHING: A LOT

## 2024-02-26 ASSESSMENT — COLUMBIA-SUICIDE SEVERITY RATING SCALE - C-SSRS

## 2024-02-26 ASSESSMENT — LIFESTYLE VARIABLES
PRESCIPTION_ABUSE_PAST_12_MONTHS: NO
AUDIT-C TOTAL SCORE: 0
HOW OFTEN DO YOU HAVE A DRINK CONTAINING ALCOHOL: NEVER
AUDIT-C TOTAL SCORE: 0
HAVE PEOPLE ANNOYED YOU BY CRITICIZING YOUR DRINKING: NO
EVER FELT BAD OR GUILTY ABOUT YOUR DRINKING: NO
HOW MANY STANDARD DRINKS CONTAINING ALCOHOL DO YOU HAVE ON A TYPICAL DAY: PATIENT DOES NOT DRINK
HAVE YOU EVER FELT YOU SHOULD CUT DOWN ON YOUR DRINKING: NO
SKIP TO QUESTIONS 9-10: 1
HOW OFTEN DO YOU HAVE 6 OR MORE DRINKS ON ONE OCCASION: NEVER
EVER HAD A DRINK FIRST THING IN THE MORNING TO STEADY YOUR NERVES TO GET RID OF A HANGOVER: NO
SUBSTANCE_ABUSE_PAST_12_MONTHS: NO

## 2024-02-26 ASSESSMENT — PAIN - FUNCTIONAL ASSESSMENT
PAIN_FUNCTIONAL_ASSESSMENT: 0-10

## 2024-02-26 ASSESSMENT — PAIN SCALES - GENERAL
PAINLEVEL_OUTOF10: 0 - NO PAIN

## 2024-02-26 ASSESSMENT — PATIENT HEALTH QUESTIONNAIRE - PHQ9
SUM OF ALL RESPONSES TO PHQ9 QUESTIONS 1 & 2: 0
2. FEELING DOWN, DEPRESSED OR HOPELESS: NOT AT ALL
1. LITTLE INTEREST OR PLEASURE IN DOING THINGS: NOT AT ALL

## 2024-02-26 NOTE — H&P
History Of Present Illness patient seen and examined by me.  Appreciate neurology consultation and NP's admission notes and orders reviewed and noted.  Case discussed with the nursing room 1 on the floor.  Camryn Garcia is a 83 y.o. male with past medical history of essential tremors, DEEPALI,/microcytic anemia/thalassemia minor/hypothyroidism, hxgout, andhx bilateral knee replacements r, who presented to [facility] ED today via EMS from home with dizziness. The patient reported a sudden onset of dizziness around 2345 last evening when making his nighttime snack. The patient states a sudden onset of a spinning sensation which he described as vertigo and had to hold onto the wall and sit down.  Patient states that he had a bout of emesis when he had the dizziness at home.  He states he has never had this before. He reports that the sensation continues despite lying stationary. No reports of fall or injury. Denies headache. States he has some new blurry vision. He states that shortly after the onset he had few episodes of vomiting. States he still feels dizzy on admission. He denies chest pain or shortness of breath. No reports of pain weakness or paresthesias to his extremities .  Speech is normal.  Patient is ambulating with a cane in the room and states that he still has dizziness but is improving since admission and he feels a spinning sensation when he moves his head or gets up from laying to standing position.  Blood pressure since admission has been borderline elevated with systolic in the 150s to 160s) blood pressure recently checked was 155/89 and pulse is 79.  Patient also had mild bradycardia on admission.  Patient denies any headaches.  He denies any sinus drainage or ear pain.  Patient has been seen by neurology and he had a CT scan brain and CTA of the neck done on admission which did not show any acute infarcts or bleeds or critical carotid stenosis.  MRI of the brain was done earlier and results will be  reviewed.  Patient has had no further nausea or vomiting.    ED course: Hemodynamically stable. Afebrile. /88, HR 57, RR 18, 96% RA.  EKG unavailable for my review. Labs: WBC 11.5, neutrophils 7.92. H&H 12.4/39.4. Glucose 136. Troponin 3. See imaging results below. Valium and IVF given in ED. NIH-0 in ED. Pt was admitted with above symptoms under observation on telemetry.     Past Medical History  Past Medical History:   Diagnosis Date    Iron deficiency anemia, unspecified     Microcytic anemia    Personal history of other diseases of the musculoskeletal system and connective tissue     History of gout    Personal history of other diseases of the respiratory system     History of bronchitis    Thalassemia minor     Beta thalassemia minor   ./History of hypothyroidism  ./History of bilateral knee replacements    Surgical History  Past Surgical History:   Procedure Laterality Date    OTHER SURGICAL HISTORY  12/10/2021    Ventral hernia repair    OTHER SURGICAL HISTORY  12/10/2021    Knee surgery        Social History  Former smoker. Denies alcohol or drug use. Lives with wife. Ambulates independently.  Patient lives at home with his wife.  He drives  Patient drinks occasionally.  Denies any heavy alcohol use.  Family History  Family history reviewed and updated no changes.     Allergies  Patient has no known allergies.    Review of Systems   Constitutional:         EXCEPT FOR HPI   All other systems reviewed and are negative.     10 point review of symptoms was performed and is negative except for what is stated in the HPI above.  Physical Exam  Constitutional:       Appearance: Normal appearance.   HENT:      Head: Normocephalic and atraumatic.      Nose: Nose normal.      Mouth/Throat:      Mouth: Mucous membranes are moist.      Pharynx: Oropharynx is clear.   Eyes:      Extraocular Movements: Extraocular movements intact.      Conjunctiva/sclera: Conjunctivae normal.      Pupils: Pupils are equal, round,  "and reactive to light.      Comments: Nystagmus noted bilaterally   Cardiovascular:      Rate and Rhythm: Normal rate and regular rhythm.      Pulses: Normal pulses.      Heart sounds: Normal heart sounds.   Pulmonary:      Effort: Pulmonary effort is normal.      Breath sounds: Normal breath sounds.   Abdominal:      General: Abdomen is flat. Bowel sounds are normal.      Palpations: Abdomen is soft.   Musculoskeletal:         General: Normal range of motion.      Cervical back: Normal range of motion and neck supple.   Skin:     General: Skin is warm and dry.      Capillary Refill: Capillary refill takes less than 2 seconds.   Neurological:      General: No focal deficit present.      Mental Status: He is alert and oriented to person, place, and time.   Psychiatric:         Mood and Affect: Mood normal.         Behavior: Behavior normal.         Thought Content: Thought content normal.         Judgment: Judgment normal.          Last Recorded Vitals  Blood pressure (!) 165/96, pulse 86, temperature 36.1 °C (97 °F), resp. rate 16, height 1.753 m (5' 9\"), weight 72.6 kg (160 lb), SpO2 95 %.  Repeat blood pressure this evening is 155/89.  Heart rate is 79.  Relevant Results  Results for orders placed or performed during the hospital encounter of 02/26/24 (from the past 24 hour(s))   CBC and Auto Differential   Result Value Ref Range    WBC 11.5 (H) 4.4 - 11.3 x10*3/uL    nRBC 0.0 0.0 - 0.0 /100 WBCs    RBC 5.60 4.50 - 5.90 x10*6/uL    Hemoglobin 12.4 (L) 13.5 - 17.5 g/dL    Hematocrit 39.4 (L) 41.0 - 52.0 %    MCV 70 (L) 80 - 100 fL    MCH 22.1 (L) 26.0 - 34.0 pg    MCHC 31.5 (L) 32.0 - 36.0 g/dL    RDW 16.1 (H) 11.5 - 14.5 %    Platelets 170 150 - 450 x10*3/uL    Neutrophils % 69.0 40.0 - 80.0 %    Immature Granulocytes %, Automated 0.4 0.0 - 0.9 %    Lymphocytes % 18.1 13.0 - 44.0 %    Monocytes % 8.0 2.0 - 10.0 %    Eosinophils % 3.7 0.0 - 6.0 %    Basophils % 0.8 0.0 - 2.0 %    Neutrophils Absolute 7.92 (H) 1.60 " - 5.50 x10*3/uL    Immature Granulocytes Absolute, Automated 0.05 0.00 - 0.50 x10*3/uL    Lymphocytes Absolute 2.08 0.80 - 3.00 x10*3/uL    Monocytes Absolute 0.92 (H) 0.05 - 0.80 x10*3/uL    Eosinophils Absolute 0.43 (H) 0.00 - 0.40 x10*3/uL    Basophils Absolute 0.09 0.00 - 0.10 x10*3/uL   Comprehensive metabolic panel   Result Value Ref Range    Glucose 136 (H) 74 - 99 mg/dL    Sodium 138 136 - 145 mmol/L    Potassium 3.5 3.5 - 5.3 mmol/L    Chloride 103 98 - 107 mmol/L    Bicarbonate 29 21 - 32 mmol/L    Anion Gap 10 10 - 20 mmol/L    Urea Nitrogen 15 6 - 23 mg/dL    Creatinine 0.92 0.50 - 1.30 mg/dL    eGFR 84 >60 mL/min/1.73m*2    Calcium 8.9 8.6 - 10.3 mg/dL    Albumin 4.1 3.4 - 5.0 g/dL    Alkaline Phosphatase 62 33 - 136 U/L    Total Protein 6.9 6.4 - 8.2 g/dL    AST 16 9 - 39 U/L    Bilirubin, Total 0.4 0.0 - 1.2 mg/dL    ALT 13 10 - 52 U/L   Troponin I, High Sensitivity   Result Value Ref Range    Troponin I, High Sensitivity 3 0 - 20 ng/L     CT angio head and neck w and wo IV contrast    Result Date: 2/26/2024  Interpreted By:  Marychuy Perez, STUDY: CT HEAD WO IV CONTRAST; CT ANGIO HEAD AND NECK W AND WO IV CONTRAST; 2/26/2024 2:49 am   INDICATION: Signs/Symptoms:dizzy   COMPARISON: Noncontrast head CT 03/04/2022   ACCESSION NUMBER(S): [id number]; [id number]   ORDERING CLINICIAN: SIMÓN ELLIOTT   TECHNIQUE: Multiple contiguous axial noncontrast images of the head were obtained. Following IV contrast administration, a CT angiography of the head  and neck was performed.  Triplanar MIPS  and 3D reconstructions of the [place]  and neck were generated.   FINDINGS: NON-CONTRAST HEAD CT:   No acute intracranial hemorrhage, mass-effect, evidence of large vessel ischemic infarct, or extra-axial fluid collection. Gray-white matter distinction is preserved.   No mass-effect, midline shift, sulcal effacement, or effacement of the basal cisterns.   The brain parenchymal volume and ventricles are  age-appropriate. No hydrocephalus.   No acute skull fracture. The visualized orbits  are intact. The visualized paranasal sinuses show  chronic opacification of the right frontal sinus and mucosal thickening in the ethmoid air cells. Mild mucosal thickening in the maxillary sinuses. The mastoids are clear.     CTA NECK:   The great vessel origins are patent with no significant stenosis.   LEFT VERTEBRAL ARTERY:  No hemodynamically significant stenosis, occlusion, or dissection.   LEFT COMMON/INTERNAL CAROTID ARTERY:  Mild calcification of the proximal internal carotid artery. Noncalcified plaque extending a length of approximately 1.7 cm in the proximal internal carotid artery resulting in 35% stenosis.   RIGHT VERTEBRAL ARTERY:  No hemodynamically significant stenosis, occlusion, or dissection.   RIGHT COMMON/INTERNAL CAROTID ARTERY: Mild calcification of the proximal internal carotid artery. No hemodynamically significant stenosis, occlusion, or dissection.     The neck soft tissues show no evidence of mass, fluid collection, or enlarged lymph nodes.   There is no acute osseous abnormality. The imaged lungs are clear. Mild-to-moderate cervical degenerative disc changes.     CTA HEAD:   ANTERIOR CIRCULATION: No hemodynamically significant intracranial stenosis, occlusion, or aneurysm.   - Internal Carotid Arteries: Patent with no hemodynamically significant stenosis.   - Middle Cerebral Arteries: Patent with no hemodynamically significant stenosis.   - Anterior Cerebral Arteries: Patent with no hemodynamically significant stenosis.     POSTERIOR CIRCULATION: No hemodynamically significant intracranial stenosis, occlusion, or aneurysm.   - Intracranial Vertebral Arteries: Patent with no hemodynamically significant stenosis.   - Basilar Artery: Patent with no hemodynamically significant stenosis.   - Posterior Cerebral Arteries: Patent with no hemodynamically significant stenosis.   No arteriovenous malformation is  visualized. No pathologic intracranial enhancement or discrete mass. The dural venous sinuses are patent.   MIPS and 3D reconstructions confirm the above findings.       1. No intracranial major arterial branch occlusion or hemodynamically significant stenosis.   2. Noncalcified plaque in the proximal left internal carotid artery resulting in 35% stenosis. No significant stenosis of the right carotid or vertebral arteries.   3. No acute intracranial abnormality.     MACRO: None   Signed by: Marychuy Perez 2/26/2024 3:49 AM Dictation workstation:   [id number]    CT head wo IV contrast    Result Date: 2/26/2024  Interpreted By:  Marychuy Perez, STUDY: CT HEAD WO IV CONTRAST; CT ANGIO HEAD AND NECK W AND WO IV CONTRAST; 2/26/2024 2:49 am   INDICATION: Signs/Symptoms:dizzy   COMPARISON: Noncontrast head CT 03/04/2022   ACCESSION NUMBER(S): [id number]; [id number]   ORDERING CLINICIAN: SIMÓN ELLIOTT   TECHNIQUE: Multiple contiguous axial noncontrast images of the head were obtained. Following IV contrast administration, a CT angiography of the head  and neck was performed.  Triplanar MIPS  and 3D reconstructions of the [place]  and neck were generated.   FINDINGS: NON-CONTRAST HEAD CT:   No acute intracranial hemorrhage, mass-effect, evidence of large vessel ischemic infarct, or extra-axial fluid collection. Gray-white matter distinction is preserved.   No mass-effect, midline shift, sulcal effacement, or effacement of the basal cisterns.   The brain parenchymal volume and ventricles are age-appropriate. No hydrocephalus.   No acute skull fracture. The visualized orbits  are intact. The visualized paranasal sinuses show  chronic opacification of the right frontal sinus and mucosal thickening in the ethmoid air cells. Mild mucosal thickening in the maxillary sinuses. The mastoids are clear.     CTA NECK:   The great vessel origins are patent with no significant stenosis.   LEFT VERTEBRAL ARTERY:  No  hemodynamically significant stenosis, occlusion, or dissection.   LEFT COMMON/INTERNAL CAROTID ARTERY:  Mild calcification of the proximal internal carotid artery. Noncalcified plaque extending a length of approximately 1.7 cm in the proximal internal carotid artery resulting in 35% stenosis.   RIGHT VERTEBRAL ARTERY:  No hemodynamically significant stenosis, occlusion, or dissection.   RIGHT COMMON/INTERNAL CAROTID ARTERY: Mild calcification of the proximal internal carotid artery. No hemodynamically significant stenosis, occlusion, or dissection.     The neck soft tissues show no evidence of mass, fluid collection, or enlarged lymph nodes.   There is no acute osseous abnormality. The imaged lungs are clear. Mild-to-moderate cervical degenerative disc changes.     CTA HEAD:   ANTERIOR CIRCULATION: No hemodynamically significant intracranial stenosis, occlusion, or aneurysm.   - Internal Carotid Arteries: Patent with no hemodynamically significant stenosis.   - Middle Cerebral Arteries: Patent with no hemodynamically significant stenosis.   - Anterior Cerebral Arteries: Patent with no hemodynamically significant stenosis.     POSTERIOR CIRCULATION: No hemodynamically significant intracranial stenosis, occlusion, or aneurysm.   - Intracranial Vertebral Arteries: Patent with no hemodynamically significant stenosis.   - Basilar Artery: Patent with no hemodynamically significant stenosis.   - Posterior Cerebral Arteries: Patent with no hemodynamically significant stenosis.   No arteriovenous malformation is visualized. No pathologic intracranial enhancement or discrete mass. The dural venous sinuses are patent.   MIPS and 3D reconstructions confirm the above findings.       1. No intracranial major arterial branch occlusion or hemodynamically significant stenosis.   2. Noncalcified plaque in the proximal left internal carotid artery resulting in 35% stenosis. No significant stenosis of the right carotid or vertebral  arteries.   3. No acute intracranial abnormality.     MACRO: None   Signed by: Marychuy Perez 2/26/2024 3:49 AM Dictation workstation:   [id number]        Assessment/Plan   Principal Problem:    Dizziness    83 year old male with past medical history of essential tremors, DEEPALI, gout, and thalassemia minor, hypothyroidism who presented to [facility] ED today via EMS from home with dizziness. The patient reported a sudden onset of dizziness around 2345 last evening when making his nighttime snack. The patient states a sudden onset of a spinning sensation which he described as vertigo and had to hold onto the wall and sit down. He states he has never had this before. He reports that the sensation continues despite lying stationary. Neurology consulted. Patient will be admitted to the hospital for further medical management.    #1#Dizziness/Bpv vs r/o Cva /CT scan of the brain on admission is negative for any acute infarcts or bleeds.  CT of the neck shows noncalcified plaque in the left carotid artery/35% stenosis and no critical stenosis.  MRI of the brain without IV contrast noted..  Ventricles and sulci and basal cisterns are diffusely prominent with moderate parenchymal volume loss.  No acute bleeds or acute infarct seen.  Chronic small vessel ischemic changes seen and regions of increased T2 signal within the bilateral basal ganglia may represent prominent perivascular spaces versus chronic lacunar infarcts.  No midline shift or mass effect seen.  The right lens has been surgically replaced.  Mucosal thickening is noted throughout the paranasal sinuses.  Patient denies any facial pain or sinus issues.  Patient has been started on meclizine and his dizziness is improving.  Will have neurology review the MRI in AM.  Patient will be started on aspirin 81 mg p.o. daily.  Also will consider starting him on a statin treatment and check lipids in AM.  2./Elevated blood pressure/uncontrolled/systolic blood pressure since  admission is running in the 160s to 150s.  Patient has no history of hypertension.  He states he is anxious of his vertigo.  Will continue to monitor the blood pressure and if it stays above 150 by a.m. will start him on amlodipine 5 mg p.o. daily.  #3#Bradycardia, intermittent/bradycardia has improved and his current heart rate is 79.  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  5./Hypothyroidism patient will be written resumed on his levothyroxine as per home dosage.  6./Mild microcytic anemia and iron deficiency anemia and history of thalassemia minor. /Mild leukocytosis WBC count on admission was 11.1 and hemoglobin stable at 12.4 and hematocrit is 39.4.  Admit to OBS/telemetry  Neurology consult and appreciate recs  See imaging results  Valium given in ED with no improvement in dizziness  Antivert ordered  IVF given in ED  Neuro checks  PT/OT  UA ordered  Repeat labs in morning  Patient is clinically improving.  #Acute on chronic anemia (DEEPALI)  H&H 12.4/39.4  Continue to monitor  ./Elevated blood pressure/systolic blood pressures in the 160s.  Patient has no history of hypertension.  Will continue to monitor and advise no added salt diet.  Will consider low-dose amlodipine 5 mg p.o. daily in a.m. if systolic blood pressure continues to remain above 150.  Patient advised on no added salt diet.  Chronic Issues  #Gout/denies any recent flareups.  #Thalassemia minor  Continue home meds as appropriate when nursing completes home med rec.  Full code    #DVT PPX  Lovenox SQ  SCD's   Total time spent 40 minutes/time spent on patient interaction/counseling/reviewing consultation notes from neurology and imaging reports and discussing with the nursing on the floor and assessment and plan and documentation.  I spent 40 minutes in the professional and overall care of this patient.    Shay Siu MD

## 2024-02-26 NOTE — H&P
History Of Present Illness  Camryn Garcia is a 83 y.o. male with past medical history of essential tremors, DEEPALI, gout, and thalassemia minor, who presented to [facility] ED today via EMS from home with dizziness. The patient reported a sudden onset of dizziness around 2345 last evening when making his nighttime snack. The patient states a sudden onset of a spinning sensation which he described as vertigo and had to hold onto the wall and sit down. He states he has never had this before. He reports that the sensation continues despite lying stationary. No reports of fall or injury. Denies headache. States he has some new blurry vision. He states that shortly after the onset he had few episodes of vomiting. States he still feels dizzy on admission. He denies chest pain or shortness of breath. No reports of pain weakness or paresthesias to his extremities     ED course: Hemodynamically stable. Afebrile. /88, HR 57, RR 18, 96% RA.  EKG unavailable for my review. Labs: WBC 11.5, neutrophils 7.92. H&H 12.4/39.4. Glucose 136. Troponin 3. See imaging results below. Valium and IVF given in ED. NIH-0 in ED. Pt is being admitted to Dr. Siu who will continue to follow. I was asked to do the H&P and initial assessment.     Past Medical History  Past Medical History:   Diagnosis Date    Iron deficiency anemia, unspecified     Microcytic anemia    Personal history of other diseases of the musculoskeletal system and connective tissue     History of gout    Personal history of other diseases of the respiratory system     History of bronchitis    Thalassemia minor     Beta thalassemia minor       Surgical History  Past Surgical History:   Procedure Laterality Date    OTHER SURGICAL HISTORY  12/10/2021    Ventral hernia repair    OTHER SURGICAL HISTORY  12/10/2021    Knee surgery        Social History  Former smoker. Denies alcohol or drug use. Lives with wife. Ambulates independently.    Family History  No family history on  "file.     Allergies  Patient has no known allergies.    Review of Systems   10 point review of symptoms was performed and is negative except for what is stated in the HPI above.  Physical Exam  Constitutional:       Appearance: Normal appearance.   HENT:      Head: Normocephalic and atraumatic.      Nose: Nose normal.      Mouth/Throat:      Mouth: Mucous membranes are moist.      Pharynx: Oropharynx is clear.   Eyes:      Extraocular Movements: Extraocular movements intact.      Conjunctiva/sclera: Conjunctivae normal.      Pupils: Pupils are equal, round, and reactive to light.      Comments: Nystagmus noted bilaterally   Cardiovascular:      Rate and Rhythm: Normal rate and regular rhythm.      Pulses: Normal pulses.      Heart sounds: Normal heart sounds.   Pulmonary:      Effort: Pulmonary effort is normal.      Breath sounds: Normal breath sounds.   Abdominal:      General: Abdomen is flat. Bowel sounds are normal.      Palpations: Abdomen is soft.   Musculoskeletal:         General: Normal range of motion.      Cervical back: Normal range of motion and neck supple.   Skin:     General: Skin is warm and dry.      Capillary Refill: Capillary refill takes less than 2 seconds.   Neurological:      General: No focal deficit present.      Mental Status: He is alert and oriented to person, place, and time.   Psychiatric:         Mood and Affect: Mood normal.         Behavior: Behavior normal.         Thought Content: Thought content normal.         Judgment: Judgment normal.          Last Recorded Vitals  Blood pressure 157/82, pulse 75, temperature 35.3 °C (95.5 °F), temperature source Temporal, resp. rate 18, height 1.753 m (5' 9\"), weight 72.6 kg (160 lb), SpO2 97 %.    Relevant Results  Results for orders placed or performed during the hospital encounter of 02/26/24 (from the past 24 hour(s))   CBC and Auto Differential   Result Value Ref Range    WBC 11.5 (H) 4.4 - 11.3 x10*3/uL    nRBC 0.0 0.0 - 0.0 /100 WBCs "    RBC 5.60 4.50 - 5.90 x10*6/uL    Hemoglobin 12.4 (L) 13.5 - 17.5 g/dL    Hematocrit 39.4 (L) 41.0 - 52.0 %    MCV 70 (L) 80 - 100 fL    MCH 22.1 (L) 26.0 - 34.0 pg    MCHC 31.5 (L) 32.0 - 36.0 g/dL    RDW 16.1 (H) 11.5 - 14.5 %    Platelets 170 150 - 450 x10*3/uL    Neutrophils % 69.0 40.0 - 80.0 %    Immature Granulocytes %, Automated 0.4 0.0 - 0.9 %    Lymphocytes % 18.1 13.0 - 44.0 %    Monocytes % 8.0 2.0 - 10.0 %    Eosinophils % 3.7 0.0 - 6.0 %    Basophils % 0.8 0.0 - 2.0 %    Neutrophils Absolute 7.92 (H) 1.60 - 5.50 x10*3/uL    Immature Granulocytes Absolute, Automated 0.05 0.00 - 0.50 x10*3/uL    Lymphocytes Absolute 2.08 0.80 - 3.00 x10*3/uL    Monocytes Absolute 0.92 (H) 0.05 - 0.80 x10*3/uL    Eosinophils Absolute 0.43 (H) 0.00 - 0.40 x10*3/uL    Basophils Absolute 0.09 0.00 - 0.10 x10*3/uL   Comprehensive metabolic panel   Result Value Ref Range    Glucose 136 (H) 74 - 99 mg/dL    Sodium 138 136 - 145 mmol/L    Potassium 3.5 3.5 - 5.3 mmol/L    Chloride 103 98 - 107 mmol/L    Bicarbonate 29 21 - 32 mmol/L    Anion Gap 10 10 - 20 mmol/L    Urea Nitrogen 15 6 - 23 mg/dL    Creatinine 0.92 0.50 - 1.30 mg/dL    eGFR 84 >60 mL/min/1.73m*2    Calcium 8.9 8.6 - 10.3 mg/dL    Albumin 4.1 3.4 - 5.0 g/dL    Alkaline Phosphatase 62 33 - 136 U/L    Total Protein 6.9 6.4 - 8.2 g/dL    AST 16 9 - 39 U/L    Bilirubin, Total 0.4 0.0 - 1.2 mg/dL    ALT 13 10 - 52 U/L   Troponin I, High Sensitivity   Result Value Ref Range    Troponin I, High Sensitivity 3 0 - 20 ng/L     CT angio head and neck w and wo IV contrast    Result Date: 2/26/2024  Interpreted By:  Marychuy Perez, STUDY: CT HEAD WO IV CONTRAST; CT ANGIO HEAD AND NECK W AND WO IV CONTRAST; 2/26/2024 2:49 am   INDICATION: Signs/Symptoms:dizzy   COMPARISON: Noncontrast head CT 03/04/2022   ACCESSION NUMBER(S): [id number]; [id number]   ORDERING CLINICIAN: SIMÓN ELLIOTT   TECHNIQUE: Multiple contiguous axial noncontrast images of the head were obtained.  Following IV contrast administration, a CT angiography of the head  and neck was performed.  Triplanar MIPS  and 3D reconstructions of the [place]  and neck were generated.   FINDINGS: NON-CONTRAST HEAD CT:   No acute intracranial hemorrhage, mass-effect, evidence of large vessel ischemic infarct, or extra-axial fluid collection. Gray-white matter distinction is preserved.   No mass-effect, midline shift, sulcal effacement, or effacement of the basal cisterns.   The brain parenchymal volume and ventricles are age-appropriate. No hydrocephalus.   No acute skull fracture. The visualized orbits  are intact. The visualized paranasal sinuses show  chronic opacification of the right frontal sinus and mucosal thickening in the ethmoid air cells. Mild mucosal thickening in the maxillary sinuses. The mastoids are clear.     CTA NECK:   The great vessel origins are patent with no significant stenosis.   LEFT VERTEBRAL ARTERY:  No hemodynamically significant stenosis, occlusion, or dissection.   LEFT COMMON/INTERNAL CAROTID ARTERY:  Mild calcification of the proximal internal carotid artery. Noncalcified plaque extending a length of approximately 1.7 cm in the proximal internal carotid artery resulting in 35% stenosis.   RIGHT VERTEBRAL ARTERY:  No hemodynamically significant stenosis, occlusion, or dissection.   RIGHT COMMON/INTERNAL CAROTID ARTERY: Mild calcification of the proximal internal carotid artery. No hemodynamically significant stenosis, occlusion, or dissection.     The neck soft tissues show no evidence of mass, fluid collection, or enlarged lymph nodes.   There is no acute osseous abnormality. The imaged lungs are clear. Mild-to-moderate cervical degenerative disc changes.     CTA HEAD:   ANTERIOR CIRCULATION: No hemodynamically significant intracranial stenosis, occlusion, or aneurysm.   - Internal Carotid Arteries: Patent with no hemodynamically significant stenosis.   - Middle Cerebral Arteries:  Patent with no hemodynamically significant stenosis.   - Anterior Cerebral Arteries: Patent with no hemodynamically significant stenosis.     POSTERIOR CIRCULATION: No hemodynamically significant intracranial stenosis, occlusion, or aneurysm.   - Intracranial Vertebral Arteries: Patent with no hemodynamically significant stenosis.   - Basilar Artery: Patent with no hemodynamically significant stenosis.   - Posterior Cerebral Arteries: Patent with no hemodynamically significant stenosis.   No arteriovenous malformation is visualized. No pathologic intracranial enhancement or discrete mass. The dural venous sinuses are patent.   MIPS and 3D reconstructions confirm the above findings.       1. No intracranial major arterial branch occlusion or hemodynamically significant stenosis.   2. Noncalcified plaque in the proximal left internal carotid artery resulting in 35% stenosis. No significant stenosis of the right carotid or vertebral arteries.   3. No acute intracranial abnormality.     MACRO: None   Signed by: Marychuy Perez 2/26/2024 3:49 AM Dictation workstation:   [id number]    CT head wo IV contrast    Result Date: 2/26/2024  Interpreted By:  Marychuy Perez, STUDY: CT HEAD WO IV CONTRAST; CT ANGIO HEAD AND NECK W AND WO IV CONTRAST; 2/26/2024 2:49 am   INDICATION: Signs/Symptoms:dizzy   COMPARISON: Noncontrast head CT 03/04/2022   ACCESSION NUMBER(S): [id number]; [id number]   ORDERING CLINICIAN: SIMÓN ELLIOTT   TECHNIQUE: Multiple contiguous axial noncontrast images of the head were obtained. Following IV contrast administration, a CT angiography of the head  and neck was performed.  Triplanar MIPS  and 3D reconstructions of the [place]  and neck were generated.   FINDINGS: NON-CONTRAST HEAD CT:   No acute intracranial hemorrhage, mass-effect, evidence of large vessel ischemic infarct, or extra-axial fluid collection. Gray-white matter distinction is preserved.   No mass-effect, midline shift, sulcal  effacement, or effacement of the basal cisterns.   The brain parenchymal volume and ventricles are age-appropriate. No hydrocephalus.   No acute skull fracture. The visualized orbits  are intact. The visualized paranasal sinuses show  chronic opacification of the right frontal sinus and mucosal thickening in the ethmoid air cells. Mild mucosal thickening in the maxillary sinuses. The mastoids are clear.     CTA NECK:   The great vessel origins are patent with no significant stenosis.   LEFT VERTEBRAL ARTERY:  No hemodynamically significant stenosis, occlusion, or dissection.   LEFT COMMON/INTERNAL CAROTID ARTERY:  Mild calcification of the proximal internal carotid artery. Noncalcified plaque extending a length of approximately 1.7 cm in the proximal internal carotid artery resulting in 35% stenosis.   RIGHT VERTEBRAL ARTERY:  No hemodynamically significant stenosis, occlusion, or dissection.   RIGHT COMMON/INTERNAL CAROTID ARTERY: Mild calcification of the proximal internal carotid artery. No hemodynamically significant stenosis, occlusion, or dissection.     The neck soft tissues show no evidence of mass, fluid collection, or enlarged lymph nodes.   There is no acute osseous abnormality. The imaged lungs are clear. Mild-to-moderate cervical degenerative disc changes.     CTA HEAD:   ANTERIOR CIRCULATION: No hemodynamically significant intracranial stenosis, occlusion, or aneurysm.   - Internal Carotid Arteries: Patent with no hemodynamically significant stenosis.   - Middle Cerebral Arteries: Patent with no hemodynamically significant stenosis.   - Anterior Cerebral Arteries: Patent with no hemodynamically significant stenosis.     POSTERIOR CIRCULATION: No hemodynamically significant intracranial stenosis, occlusion, or aneurysm.   - Intracranial Vertebral Arteries: Patent with no hemodynamically significant stenosis.   - Basilar Artery: Patent with no hemodynamically significant stenosis.   - Posterior Cerebral  Arteries: Patent with no hemodynamically significant stenosis.   No arteriovenous malformation is visualized. No pathologic intracranial enhancement or discrete mass. The dural venous sinuses are patent.   MIPS and 3D reconstructions confirm the above findings.       1. No intracranial major arterial branch occlusion or hemodynamically significant stenosis.   2. Noncalcified plaque in the proximal left internal carotid artery resulting in 35% stenosis. No significant stenosis of the right carotid or vertebral arteries.   3. No acute intracranial abnormality.     MACRO: None   Signed by: Marychuy Perez 2/26/2024 3:49 AM Dictation workstation:   [id number]        Assessment/Plan   Principal Problem:    Dizziness    83 year old male with past medical history of essential tremors, DEEPALI, gout, and thalassemia minor, who presented to [facility] ED today via EMS from home with dizziness. The patient reported a sudden onset of dizziness around 2345 last evening when making his nighttime snack. The patient states a sudden onset of a spinning sensation which he described as vertigo and had to hold onto the wall and sit down. He states he has never had this before. He reports that the sensation continues despite lying stationary. Neurology consulted. Patient will be admitted to the hospital for further medical management.    #Dizziness  #Bradycardia, intermittent  #Nausea/vomiting  Admit to OBS/telemetry to Dr. Siu  Neurology consult and appreciate recs  See imaging results  Defer MRI due to implant  Valium given in ED with no improvement in dizziness  Antivert ordered  IVF given in ED  Neuro checks  PT/OT  UA ordered  Repeat labs in morning    #Acute on chronic anemia (DEEPALI)  H&H 12.4/39.4  Continue to monitor    Chronic Issues  #Gout  #Thalassemia minor  Continue home meds as appropriate when nursing completes home med rec.  Full code    #DVT PPX  Lovenox SQ  SCD's     I spent 35 minutes in the professional and overall care of  this patient.    Elaine Guardado, APRN-CNP

## 2024-02-26 NOTE — CONSULTS
"Inpatient consult to Neurology  Consult performed by: Vivienne Lowery MD  Consult ordered by: HEATHER Webb-CNP        History Of Present Illness  This is a 84 yo male with underlying essential tremors on primidone, iron deficiency anemia and thalassemia minor, OA,  and gout who presented to the ED on 2/26/2024 due to acute onset of dizziness that is started on 2/25/2024 evening associated with nausea/vomiting 3-4 times. Patient stated that he was feeling that the room was spinning and had to hold himself onto the wall and sit down.  The sensation continued when resting. States that is worse when trying to walk. Feeling that is gait is unsteady. Initial NIH was 0.      ED course: received 500 mL of normal saline, 25 mg of meclizine, and 2 mg of diazepam.  EKG sinus bradycardia HR 58. Labs: WBC 11.5, neutrophils 7.92. H&H 12.4/39.4. Glucose 136. Troponin 3. CT head and CTA head and neck completed- no acute abnormality. Noncalcified plaque in the proximal left internal carotid artery  resulting in 35% stenosis. Neurology consulted due to possible stroke. Ordered brain MR. Previous note motioned \"defer MRI due to implants\". Patient had knee replacement and dental implants. Talked to his nurse for new MRI check list.       Past Medical History  Past Medical History:   Diagnosis Date    Iron deficiency anemia, unspecified     Microcytic anemia    Personal history of other diseases of the musculoskeletal system and connective tissue     History of gout    Personal history of other diseases of the respiratory system     History of bronchitis    Thalassemia minor     Beta thalassemia minor     Surgical History  Past Surgical History:   Procedure Laterality Date    OTHER SURGICAL HISTORY  12/10/2021    Ventral hernia repair    OTHER SURGICAL HISTORY  12/10/2021    Knee surgery     Social History  Former smoker-half pack for 50 years, quit around 2005.    Allergies  Patient has no known allergies.  (Not in a " hospital admission)      Review of Systems  A 12 point review of systems was performed and all systems were negative/normal except what is noted in the HPI. Please refer to the HPI for details.    Neurological Exam  Neurological Exam    Mental Status: awake, alert and oriented to person, place and time. Recent and remote memory are intact. Speech is normal. Follows complex commands. Attention and concentration are normal. Apraxia absent.     Cranial Nerves  CN II: Visual acuity is normal. Visual fields full to confrontation.  CN III, IV, VI: Extraocular movements intact bilaterally. Normal lids and orbits bilaterally. Mild Nystagmus.   CN V: Facial sensation is normal.  CN VII: Full and symmetric facial movement.  CN VIII: Hearing is normal.  CN IX, X: Palate elevates symmetrically  CN XI: Shoulder shrug strength is normal.  CN XII: Tongue midline without atrophy or fasciculations.     Head thrust maneuver: positive to the left.     Motor  Normal muscle bulk throughout. No fasciculations present. Normal muscle tone. No abnormal involuntary movements. Strength is 5/5 throughout all four extremities.     Sensory  Normal sensory throughout all four extremities.      Reflexes  Deep tendon reflexes are 2+ and symmetric in all four extremities.     Coordination  Finger-to-nose, rapid alternating movements and heel-to-shin normal bilaterally without dysmetria.     Gait  Toe, heel and tandem gait. Unsteady gait when walking with a cane.       Physical Exam  Constitutional: alert and oriented x3, no acute distress.  Eyes: PERRL, EOMI, clear sclera.  Head/Neck: Neck supple, no apparent injury, No JVD, no bruits.  Respiratory: clear breath sounds, no wheezes or rhonchi.   Cardiovascular: regular rate and rhythm, no murmurs.   Gastrointestinal: soft abdomen, non-tender, no rebound.  Genitourinary: no lunsford.  Musculoskeletal: no joint swelling.   Extremities: no LE edema.  Skin: warm and dry, no rashes.    Last Recorded  "Vitals  Blood pressure (!) 160/94, pulse 77, temperature 35.3 °C (95.5 °F), temperature source Temporal, resp. rate 16, height 1.753 m (5' 9\"), weight 72.6 kg (160 lb), SpO2 95 %.    Relevant Results        NIH Stroke Scale  1A. Level of Consciousness: Alert, Keenly Responsive  1B. Ask Month and Age: Both Questions Right  1C. Blink Eyes & Squeeze Hands: Performs Both Tasks  2. Best Gaze: Normal  3. Visual: No Visual Loss  4. Facial Palsy: Normal Symmetrical Movements  5A. Motor - Left Arm: No Drift  5B. Motor - Right Arm: No Drift  6A. Motor - Left Leg: No Drift  6B. Motor - Right Leg: No Drift  7. Limb Ataxia: Absent  8. Sensory Loss: Normal  9. Best Language: No Aphasia  10. Dysarthria: Normal  11. Extinction and Inattention: No Abnormality  NIH Stroke Scale: 0           Adrian Coma Scale  Best Eye Response: Spontaneous  Best Verbal Response: Oriented  Best Motor Response: Follows commands  Kate Coma Scale Score: 15                 I have personally reviewed the following imaging results CT angio head and neck w and wo IV contrast    Result Date: 2/26/2024  Interpreted By:  Marychuy Perez, STUDY: CT HEAD WO IV CONTRAST; CT ANGIO HEAD AND NECK W AND WO IV CONTRAST; 2/26/2024 2:49 am   INDICATION: Signs/Symptoms:dizzy   COMPARISON: Noncontrast head CT 03/04/2022   ACCESSION NUMBER(S): [id number]; [id number]   ORDERING CLINICIAN: SIMÓN ELLIOTT   TECHNIQUE: Multiple contiguous axial noncontrast images of the head were obtained. Following IV contrast administration, a CT angiography of the head  and neck was performed.  Triplanar MIPS  and 3D reconstructions of the [place]  and neck were generated.   FINDINGS: NON-CONTRAST HEAD CT:   No acute intracranial hemorrhage, mass-effect, evidence of large vessel ischemic infarct, or extra-axial fluid collection. Gray-white matter distinction is preserved.   No mass-effect, midline shift, sulcal effacement, or effacement of the basal cisterns.   The brain " parenchymal volume and ventricles are age-appropriate. No hydrocephalus.   No acute skull fracture. The visualized orbits  are intact. The visualized paranasal sinuses show  chronic opacification of the right frontal sinus and mucosal thickening in the ethmoid air cells. Mild mucosal thickening in the maxillary sinuses. The mastoids are clear.     CTA NECK:   The great vessel origins are patent with no significant stenosis.   LEFT VERTEBRAL ARTERY:  No hemodynamically significant stenosis, occlusion, or dissection.   LEFT COMMON/INTERNAL CAROTID ARTERY:  Mild calcification of the proximal internal carotid artery. Noncalcified plaque extending a length of approximately 1.7 cm in the proximal internal carotid artery resulting in 35% stenosis.   RIGHT VERTEBRAL ARTERY:  No hemodynamically significant stenosis, occlusion, or dissection.   RIGHT COMMON/INTERNAL CAROTID ARTERY: Mild calcification of the proximal internal carotid artery. No hemodynamically significant stenosis, occlusion, or dissection.     The neck soft tissues show no evidence of mass, fluid collection, or enlarged lymph nodes.   There is no acute osseous abnormality. The imaged lungs are clear. Mild-to-moderate cervical degenerative disc changes.     CTA HEAD:   ANTERIOR CIRCULATION: No hemodynamically significant intracranial stenosis, occlusion, or aneurysm.   - Internal Carotid Arteries: Patent with no hemodynamically significant stenosis.   - Middle Cerebral Arteries: Patent with no hemodynamically significant stenosis.   - Anterior Cerebral Arteries: Patent with no hemodynamically significant stenosis.     POSTERIOR CIRCULATION: No hemodynamically significant intracranial stenosis, occlusion, or aneurysm.   - Intracranial Vertebral Arteries: Patent with no hemodynamically significant stenosis.   - Basilar Artery: Patent with no hemodynamically significant stenosis.   - Posterior Cerebral Arteries: Patent with no hemodynamically significant  stenosis.   No arteriovenous malformation is visualized. No pathologic intracranial enhancement or discrete mass. The dural venous sinuses are patent.   MIPS and 3D reconstructions confirm the above findings.       1. No intracranial major arterial branch occlusion or hemodynamically significant stenosis.   2. Noncalcified plaque in the proximal left internal carotid artery resulting in 35% stenosis. No significant stenosis of the right carotid or vertebral arteries.   3. No acute intracranial abnormality.     MACRO: None   Signed by: Marychuy Perez 2/26/2024 3:49 AM Dictation workstation:   [id number]    CT head wo IV contrast    Result Date: 2/26/2024  Interpreted By:  Marychuy Perez, STUDY: CT HEAD WO IV CONTRAST; CT ANGIO HEAD AND NECK W AND WO IV CONTRAST; 2/26/2024 2:49 am   INDICATION: Signs/Symptoms:dizzy   COMPARISON: Noncontrast head CT 03/04/2022   ACCESSION NUMBER(S): [id number]; [id number]   ORDERING CLINICIAN: SIMÓN ELLIOTT   TECHNIQUE: Multiple contiguous axial noncontrast images of the head were obtained. Following IV contrast administration, a CT angiography of the head  and neck was performed.  Triplanar MIPS  and 3D reconstructions of the [place]  and neck were generated.   FINDINGS: NON-CONTRAST HEAD CT:   No acute intracranial hemorrhage, mass-effect, evidence of large vessel ischemic infarct, or extra-axial fluid collection. Gray-white matter distinction is preserved.   No mass-effect, midline shift, sulcal effacement, or effacement of the basal cisterns.   The brain parenchymal volume and ventricles are age-appropriate. No hydrocephalus.   No acute skull fracture. The visualized orbits  are intact. The visualized paranasal sinuses show  chronic opacification of the right frontal sinus and mucosal thickening in the ethmoid air cells. Mild mucosal thickening in the maxillary sinuses. The mastoids are clear.     CTA NECK:   The great vessel origins are patent with no significant  stenosis.   LEFT VERTEBRAL ARTERY:  No hemodynamically significant stenosis, occlusion, or dissection.   LEFT COMMON/INTERNAL CAROTID ARTERY:  Mild calcification of the proximal internal carotid artery. Noncalcified plaque extending a length of approximately 1.7 cm in the proximal internal carotid artery resulting in 35% stenosis.   RIGHT VERTEBRAL ARTERY:  No hemodynamically significant stenosis, occlusion, or dissection.   RIGHT COMMON/INTERNAL CAROTID ARTERY: Mild calcification of the proximal internal carotid artery. No hemodynamically significant stenosis, occlusion, or dissection.     The neck soft tissues show no evidence of mass, fluid collection, or enlarged lymph nodes.   There is no acute osseous abnormality. The imaged lungs are clear. Mild-to-moderate cervical degenerative disc changes.     CTA HEAD:   ANTERIOR CIRCULATION: No hemodynamically significant intracranial stenosis, occlusion, or aneurysm.   - Internal Carotid Arteries: Patent with no hemodynamically significant stenosis.   - Middle Cerebral Arteries: Patent with no hemodynamically significant stenosis.   - Anterior Cerebral Arteries: Patent with no hemodynamically significant stenosis.     POSTERIOR CIRCULATION: No hemodynamically significant intracranial stenosis, occlusion, or aneurysm.   - Intracranial Vertebral Arteries: Patent with no hemodynamically significant stenosis.   - Basilar Artery: Patent with no hemodynamically significant stenosis.   - Posterior Cerebral Arteries: Patent with no hemodynamically significant stenosis.   No arteriovenous malformation is visualized. No pathologic intracranial enhancement or discrete mass. The dural venous sinuses are patent.   MIPS and 3D reconstructions confirm the above findings.       1. No intracranial major arterial branch occlusion or hemodynamically significant stenosis.   2. Noncalcified plaque in the proximal left internal carotid artery resulting in 35% stenosis. No significant  stenosis of the right carotid or vertebral arteries.   3. No acute intracranial abnormality.     MACRO: None   Signed by: Marychuy Perez 2/26/2024 3:49 AM Dictation workstation:   [id number]  .     Assessment/Plan   Principal Problem:    Dizziness      Assessment & Plan Neurology:    #Acute CVA vs Benign Paroxysmal Positional vertigo   -patient is c/o dizziness when turning his head and walking  -head thrust maneuver test positive to the left  -CT head and CTA head and neck completed- no acute abnormality. Noncalcified plaque in the proximal left internal carotid artery resulting in 35% stenosis.  -patient is still symptomatic after medications  --ordered MR brain wo contrast, will follow up with imaging  --avoid dehydration that can worsen symptoms         Vivienne Lowery MD

## 2024-02-26 NOTE — PROGRESS NOTES
Physical Therapy    Physical Therapy    Physical Therapy Evaluation    Patient Name: Camryn Garcia  MRN: [id number]  Today's Date: 2/26/2024   Time Calculation  Start Time: 1035  Stop Time: 1056  Time Calculation (min): 21 min    Assessment/Plan   PT Assessment  PT Assessment Results: Decreased strength, Decreased endurance, Impaired balance, Decreased mobility  End of Session Communication: Bedside nurse  End of Session Patient Position: On cart (2 rails up)  IP OR SWING BED PT PLAN  Inpatient or Swing Bed: Inpatient  PT Plan  Treatment/Interventions: Transfer training, Gait training, Strengthening  PT Plan: Skilled PT  PT Frequency: 3 times per week  PT Discharge Recommendations: Low intensity level of continued care  PT - OK to Discharge: Yes    Subjective         General Visit Information:  General  Reason for Referral: dizziness, n&v, b nystagmus, ct/angio head (-), johnny, acute on chronic anemia  Past Medical History Relevant to Rehab: essential tremors, morenita, gout, thalassemia minor, microcytic anemia, hernia repair, b tkr  Prior to Session Communication: Bedside nurse  Patient Position Received: On cart (2 rails up)  General Comment: per pt feels disorientated    Home Living:  Home Living  Type of Home: House  Lives With: Spouse  Home Adaptive Equipment: Walker rolling or standard, Cane  Home Layout: Multi-level (split level home, bed/bath top level but does have bed/full bath lower level)  Home Access: Stairs to enter with rails  Entrance Stairs-Number of Steps:  (2)    Prior Level of Function:  Prior Function Per Pt/Caregiver Report  Level of [place]:  (indep no ad, drives)    Precautions:  Precautions  Precautions Comment: falls    Vital Signs:     Objective     Pain:  Pain Assessment  Pain Score: 0 - No pain    Cognition:  Cognition  Overall Cognitive Status: Within Functional Limits    General Assessments:         Coordination  Coordination Comment:  (b toe tap wfl)             Functional  Assessments:     Bed Mobility  Bed Mobility:  (indep)  Transfers  Transfer:  (min of 1 unsteady)  Ambulation/Gait Training  Ambulation/Gait Training Performed:  (w/o ad mod of 1 to prevent lob as pt unsteady, best results w/. rw 30ft, feeling nauseous, high fall risk)          Extremity/Trunk Assessments:        RLE   RLE : Within Functional Limits  LLE   LLE : Within Functional Limits    Outcome Measures:  [facility] Basic Mobility  Turning from your back to your side while in a flat bed without using bedrails: None  Moving from lying on your back to sitting on the side of a flat bed without using bedrails: None  Moving to and from bed to chair (including a wheelchair): A little  Standing up from a chair using your arms (e.g. wheelchair or bedside chair): A little  To walk in hospital room: A little  Climbing 3-5 steps with railing: A little  Basic Mobility - Total Score: 20                            Goals:  Encounter Problems       Encounter Problems (Active)       PT Problem       PT Goal 1 (Progressing)       Start:  02/26/24    Expected End:  03/11/24       Indep txs          PT Goal 2 (Progressing)       Start:  02/26/24    Expected End:  03/11/24       Mod indep/s amb w/ ad if needed 150ft x3         PT Goal 3 (Progressing)       Start:  02/26/24    Expected End:  03/11/24       20-30 reps ble there ex              Education Documentation  Mobility Training, taught by Lena Ya, PT at 2/26/2024  1:41 PM.  Learner: Patient  Readiness: Acceptance  Method: Explanation  Response: Verbalizes Understanding    Education Comments  No comments found.

## 2024-02-26 NOTE — ED PROVIDER NOTES
HPI   Chief Complaint   Patient presents with    Dizziness     Patient presents to the ED by EMS for dizziness. Patient states dizziness started around 2345 last night and has persisted since then. Patient reports relief with laying flat. Endorses dizziness while being triaged, sitting up in bed. Patient denies CP, SOB, headache.        83-year-old male with a significant past medical history of essential tremors, DEEPALI, gout, and thalassemia minor presents from home via EMS for complaints of dizziness.  The patient reported a sudden onset of dizziness around 2345 last evening.  The patient states a sudden onset of a spinning sensation which she describes as vertigo leg.  He states he has never had this before.  He reports that the sensation continues despite lying stationary.  No reports of fall or injury.  Denies headache or acute visual change.  He states that shortly after the onset he had few episodes of vomiting.  He denies chest pain or shortness of breath.  No reports of pain weakness or paresthesias to his extremities                          Kate Coma Scale Score: 15         NIH Stroke Scale: 0             Patient History   Past Medical History:   Diagnosis Date    Iron deficiency anemia, unspecified     Microcytic anemia    Personal history of other diseases of the musculoskeletal system and connective tissue     History of gout    Personal history of other diseases of the respiratory system     History of bronchitis    Thalassemia minor     Beta thalassemia minor     Past Surgical History:   Procedure Laterality Date    OTHER SURGICAL HISTORY  12/10/2021    Ventral hernia repair    OTHER SURGICAL HISTORY  12/10/2021    Knee surgery     No family history on file.  Social History     Tobacco Use    Smoking status: Never    Smokeless tobacco: Never   Substance Use Topics    Alcohol use: Never    Drug use: Never       Physical Exam   ED Triage Vitals [02/26/24 0123]   Temperature Heart Rate Respirations BP    35.3 °C (95.5 °F) 57 18 162/88      Pulse Ox Temp Source Heart Rate Source Patient Position   96 % Temporal -- Lying      BP Location FiO2 (%)     Right arm --       Physical Exam  Vitals and nursing note reviewed.   Constitutional:       General: He is not in acute distress.     Appearance: Normal appearance. He is normal weight. He is not ill-appearing, toxic-appearing or diaphoretic.   HENT:      Head: Normocephalic.      Nose: Nose normal.      Mouth/Throat:      Mouth: Mucous membranes are moist.   Eyes:      Extraocular Movements: Extraocular movements intact.      Conjunctiva/sclera: Conjunctivae normal.   Cardiovascular:      Rate and Rhythm: Normal rate and regular rhythm.      Pulses: Normal pulses.   Pulmonary:      Effort: Pulmonary effort is normal. No respiratory distress.      Breath sounds: Normal breath sounds.   Abdominal:      General: Abdomen is flat. There is no distension.      Palpations: Abdomen is soft.      Tenderness: There is no abdominal tenderness. There is no guarding or rebound.   Musculoskeletal:         General: Normal range of motion.      Cervical back: Normal range of motion and neck supple.   Skin:     General: Skin is warm and dry.      Capillary Refill: Capillary refill takes less than 2 seconds.   Neurological:      General: No focal deficit present.      Mental Status: He is alert and oriented to person, place, and time.      Comments: Horizontal nystagmus present bilaterally   Psychiatric:         Mood and Affect: Mood normal.         Behavior: Behavior normal.         Thought Content: Thought content normal.         Judgment: Judgment normal.         ED Course & [facility]   ED Course as of 02/26/24 0352   Mon Feb 26, 2024 0149 EKG interpreted by me.  Sinus bradycardia.  Rate of 58.  First-degree AV block.  No acute ischemia.  No STEMI. [MK]      ED Course User Index  [MK] Olu Plasencia MD         Diagnoses as of 02/26/24 0352   Dizziness       Medical Decision  Making  Patient seen and evaluated for a complaint of dizziness.  Patient was found to have no lateralizing deficits on exam.  Patient was found to have horizontal nystagmus bilaterally.  Blood work along with EKG was obtained.  CT angio of the head and neck was also ordered patient was given Valium with little improvement.  CTA obtained reviewed.  There was no evidence of large vessel occlusion.  There was patent vertebral arteries.  However, given age and medical risk factor, I do feel that the patient will benefit from observation for potential MRI and symptom control.  He is agreeable with plan of care.  Patient was discussed with Dr. Siu who agrees.        Procedure  Procedures     Olu Plasencia MD  02/26/24 0356

## 2024-02-26 NOTE — ED NOTES
Patient offered a breakfast tray but pleasantly refused at this time.  Will continue to monitor closely       Tereso Jimenes RN  02/26/24 0814

## 2024-02-26 NOTE — PROGRESS NOTES
Occupational Therapy    Evaluation    Patient Name: Camryn Garcia  MRN: [id number]  Today's Date: 2/26/2024  Time Calculation  Start Time: 1034  Stop Time: 1056  Time Calculation (min): 22 min    Assessment  IP OT Assessment  OT Assessment: Decline in function from current status - PTA indep without  device to requireing mod a  without a device. AROM/strength wfl  Plan:  OT Frequency: 3 times per week  OT Discharge Recommendations: Moderate intensity level of continued care  OT - OK to Discharge:  (okay to discharge to next level of care once cleared by medical team)    Subjective   Current Problem:  1. Dizziness          General:  General  Reason for Referral: dizziness, n&v, b nystagmus, ct/angio head (-), johnny, acute on chronic anemia  Past Medical History Relevant to Rehab: The patient reported a sudden onset of dizziness around 2345 last evening.  The patient states a sudden onset of a spinning sensation.New onset of nystagmus (past medical history of essential tremors, DEEPALI, gout, and thalassemia minor, desmond tkr , anemia,bradycardia ,hernia repair presents from home via EMS for complaints of dizziness)  Prior to Session Communication: Bedside nurse  Patient Position Received: On cart (2 rails up)  Precautions:  Precautions Comment: falls  Vital Signs:     Pain:  Pain Assessment  Pain Score: 0 - No pain    Objective   Cognition:wfl              Home Living:  Home Living Comments: Pt live in a split level home 7 entry steps up with rail to bedrooms-standard bed ( tub/shower combination) and standard toliet. no dme. wife available to assist. Walk  in shower  when entering home - 3 steps down  no dme. Pt has been indep with adl/home mgnt and drivnig prior to admit   Prior Function:  Hand Dominance: Right  IADL History:     ADL:previously indpe     Activity Tolerance:fair minus     Bed Mobility/Transfers: Bed Mobility  Bed Mobility:  (hob elevated s/indep supine to sit and sit to supine)    Transfers  Transfer:   (sit to stand cg with/without a device)      Ambulation/Gait Training:  Ambulation/Gait Training  Ambulation/Gait Training Performed: Yes (functional mobility cg  device- mod a  and  min a with wheeled walker  - min a)  Modalities:     IADL's:      Vision:    Sensation:wfl     Strength:  Strength Comments: strength below elbow wfl  Perception:wfl     Coordination:wfl      Hand Function:right     Extremities: RUE   RUE : Within Functional Limits and LUE   LUE: Within Functional Limits    Outcome Measures: [facility] Daily Activity  Putting on and taking off regular lower body clothing: A lot  Bathing (including washing, rinsing, drying): A lot  Putting on and taking off regular upper body clothing: None  Toileting, which includes using toilet, bedpan or urinal: A lot  Taking care of personal grooming such as brushing teeth: (S) A little (sink level)  Eating Meals: None  Daily Activity - Total Score: 17      Education Documentation  Mobility Training, taught by Georgiana Menchaca OT at 2/26/2024  1:59 PM.  Learner: Patient  Readiness: Acceptance  Method: Demonstration  Response: Demonstrated Understanding, Needs Reinforcement    Education Comments  No comments found.      Goals:   Encounter Problems       Encounter Problems (Active)       impaired functional daily living skills short term goals      Pt will increase Grooming to s/mod indep sink level Upper Body Bathing to indep    Lower Body Bathing  to s/mod indep     LE Dressing to s/mod indep with/ without adaptive equipment as neededOT  (Progressing)       Start:  02/26/24            Pt will increase Functional Transfers Bed Mobility to indep    Sit to Stand  to mod indep  Functional Mobility  with /without a device to s/mod indep  chair/toliet/room to increase indep/safety in patients discharge environmentOT Goal 2 (Progressing)       Start:  02/26/24

## 2024-02-27 VITALS
WEIGHT: 160 LBS | RESPIRATION RATE: 18 BRPM | OXYGEN SATURATION: 91 % | TEMPERATURE: 97.7 F | SYSTOLIC BLOOD PRESSURE: 137 MMHG | DIASTOLIC BLOOD PRESSURE: 77 MMHG | HEART RATE: 79 BPM | HEIGHT: 69 IN | BODY MASS INDEX: 23.7 KG/M2

## 2024-02-27 PROBLEM — G89.29 CHRONIC KNEE PAIN: Status: ACTIVE | Noted: 2024-02-27

## 2024-02-27 PROBLEM — Z86.39 HISTORY OF HYPERGLYCEMIA: Status: ACTIVE | Noted: 2023-08-19

## 2024-02-27 PROBLEM — H81.22: Status: ACTIVE | Noted: 2024-02-27

## 2024-02-27 PROBLEM — K82.4 GALL BLADDER POLYP: Status: ACTIVE | Noted: 2023-08-19

## 2024-02-27 PROBLEM — H81.22: Status: RESOLVED | Noted: 2024-02-27 | Resolved: 2024-02-27

## 2024-02-27 PROBLEM — D50.9 MICROCYTIC ANEMIA: Status: ACTIVE | Noted: 2024-02-27

## 2024-02-27 PROBLEM — R69 DISORDER: Status: ACTIVE | Noted: 2024-02-27

## 2024-02-27 PROBLEM — N40.0 BENIGN PROSTATIC HYPERPLASIA WITHOUT LOWER URINARY TRACT SYMPTOMS: Status: ACTIVE | Noted: 2023-08-19

## 2024-02-27 PROBLEM — Z96.653 HISTORY OF TOTAL BILATERAL KNEE REPLACEMENT: Status: ACTIVE | Noted: 2023-08-19

## 2024-02-27 PROBLEM — K57.90 DIVERTICULOSIS: Status: ACTIVE | Noted: 2023-08-19

## 2024-02-27 PROBLEM — J45.909 ASTHMA (HHS-HCC): Status: ACTIVE | Noted: 2024-02-27

## 2024-02-27 PROBLEM — R42 DIZZINESS: Status: RESOLVED | Noted: 2024-02-26 | Resolved: 2024-02-27

## 2024-02-27 PROBLEM — J98.01 COUGH DUE TO BRONCHOSPASM: Status: ACTIVE | Noted: 2023-09-07

## 2024-02-27 PROBLEM — J45.20 MILD INTERMITTENT ASTHMA WITHOUT COMPLICATION (HHS-HCC): Status: ACTIVE | Noted: 2023-08-19

## 2024-02-27 PROBLEM — E03.9 ACQUIRED HYPOTHYROIDISM: Status: ACTIVE | Noted: 2023-08-19

## 2024-02-27 PROBLEM — R25.1 TREMOR OF RIGHT HAND: Status: ACTIVE | Noted: 2024-02-27

## 2024-02-27 PROBLEM — J40 BRONCHITIS: Status: ACTIVE | Noted: 2024-02-27

## 2024-02-27 PROBLEM — M10.9 GOUT: Status: ACTIVE | Noted: 2024-02-27

## 2024-02-27 PROBLEM — H90.3 SENSORINEURAL HEARING LOSS (SNHL) OF BOTH EARS: Status: ACTIVE | Noted: 2023-08-19

## 2024-02-27 PROBLEM — E03.8 TSH (THYROID-STIMULATING HORMONE DEFICIENCY): Status: ACTIVE | Noted: 2023-08-19

## 2024-02-27 PROBLEM — J30.2 SEASONAL ALLERGIC RHINITIS: Status: ACTIVE | Noted: 2023-09-07

## 2024-02-27 PROBLEM — M25.569 CHRONIC KNEE PAIN: Status: ACTIVE | Noted: 2024-02-27

## 2024-02-27 PROBLEM — M47.816 SPONDYLOSIS OF LUMBAR SPINE: Status: ACTIVE | Noted: 2023-08-19

## 2024-02-27 PROBLEM — K40.90 LEFT INGUINAL HERNIA: Status: ACTIVE | Noted: 2024-02-27

## 2024-02-27 PROBLEM — D56.3 BETA THALASSEMIA TRAIT: Status: ACTIVE | Noted: 2024-02-27

## 2024-02-27 LAB
ANION GAP SERPL CALC-SCNC: 12 MMOL/L (ref 10–20)
ATRIAL RATE: 57 BPM
BUN SERPL-MCNC: 17 MG/DL (ref 6–23)
CALCIUM SERPL-MCNC: 9 MG/DL (ref 8.6–10.3)
CHLORIDE SERPL-SCNC: 102 MMOL/L (ref 98–107)
CHOLEST SERPL-MCNC: 184 MG/DL (ref 0–199)
CHOLESTEROL/HDL RATIO: 4.7
CO2 SERPL-SCNC: 28 MMOL/L (ref 21–32)
CREAT SERPL-MCNC: 1.04 MG/DL (ref 0.5–1.3)
EGFRCR SERPLBLD CKD-EPI 2021: 72 ML/MIN/1.73M*2
ERYTHROCYTE [DISTWIDTH] IN BLOOD BY AUTOMATED COUNT: 16.4 % (ref 11.5–14.5)
GLUCOSE SERPL-MCNC: 113 MG/DL (ref 74–99)
HCT VFR BLD AUTO: 38.2 % (ref 41–52)
HDLC SERPL-MCNC: 39.3 MG/DL
HGB BLD-MCNC: 11.9 G/DL (ref 13.5–17.5)
LDLC SERPL CALC-MCNC: 110 MG/DL
MCH RBC QN AUTO: 21.9 PG (ref 26–34)
MCHC RBC AUTO-ENTMCNC: 31.2 G/DL (ref 32–36)
MCV RBC AUTO: 70 FL (ref 80–100)
NON HDL CHOLESTEROL: 145 MG/DL (ref 0–149)
NRBC BLD-RTO: 0 /100 WBCS (ref 0–0)
P AXIS: 70 DEGREES
PLATELET # BLD AUTO: 176 X10*3/UL (ref 150–450)
POTASSIUM SERPL-SCNC: 4.1 MMOL/L (ref 3.5–5.3)
PR INTERVAL: 234 MS
Q ONSET: 249 MS
QRS COUNT: 10 BEATS
QRS DURATION: 101 MS
QT INTERVAL: 433 MS
QTC CALCULATION(BAZETT): 426 MS
QTC FREDERICIA: 427 MS
R AXIS: -40 DEGREES
RBC # BLD AUTO: 5.43 X10*6/UL (ref 4.5–5.9)
SODIUM SERPL-SCNC: 138 MMOL/L (ref 136–145)
T AXIS: 59 DEGREES
T OFFSET: 466 MS
TRIGL SERPL-MCNC: 174 MG/DL (ref 0–149)
TSH SERPL-ACNC: 3.08 MIU/L (ref 0.44–3.98)
VENTRICULAR RATE: 58 BPM
VLDL: 35 MG/DL (ref 0–40)
WBC # BLD AUTO: 6.6 X10*3/UL (ref 4.4–11.3)

## 2024-02-27 PROCEDURE — 99232 SBSQ HOSP IP/OBS MODERATE 35: CPT | Performed by: NURSE PRACTITIONER

## 2024-02-27 PROCEDURE — 83036 HEMOGLOBIN GLYCOSYLATED A1C: CPT | Performed by: NURSE PRACTITIONER

## 2024-02-27 PROCEDURE — 2500000002 HC RX 250 W HCPCS SELF ADMINISTERED DRUGS (ALT 637 FOR MEDICARE OP, ALT 636 FOR OP/ED): Mod: MUE | Performed by: INTERNAL MEDICINE

## 2024-02-27 PROCEDURE — 84443 ASSAY THYROID STIM HORMONE: CPT | Performed by: INTERNAL MEDICINE

## 2024-02-27 PROCEDURE — 2500000001 HC RX 250 WO HCPCS SELF ADMINISTERED DRUGS (ALT 637 FOR MEDICARE OP): Performed by: INTERNAL MEDICINE

## 2024-02-27 PROCEDURE — G0378 HOSPITAL OBSERVATION PER HR: HCPCS

## 2024-02-27 PROCEDURE — 94640 AIRWAY INHALATION TREATMENT: CPT

## 2024-02-27 PROCEDURE — 36415 COLL VENOUS BLD VENIPUNCTURE: CPT | Performed by: INTERNAL MEDICINE

## 2024-02-27 PROCEDURE — 97116 GAIT TRAINING THERAPY: CPT | Mod: GP

## 2024-02-27 PROCEDURE — 2500000004 HC RX 250 GENERAL PHARMACY W/ HCPCS (ALT 636 FOR OP/ED)

## 2024-02-27 PROCEDURE — 80061 LIPID PANEL: CPT | Performed by: INTERNAL MEDICINE

## 2024-02-27 PROCEDURE — 2500000004 HC RX 250 GENERAL PHARMACY W/ HCPCS (ALT 636 FOR OP/ED): Performed by: NURSE PRACTITIONER

## 2024-02-27 PROCEDURE — 80048 BASIC METABOLIC PNL TOTAL CA: CPT

## 2024-02-27 PROCEDURE — 85027 COMPLETE CBC AUTOMATED: CPT

## 2024-02-27 PROCEDURE — 2500000002 HC RX 250 W HCPCS SELF ADMINISTERED DRUGS (ALT 637 FOR MEDICARE OP, ALT 636 FOR OP/ED): Performed by: NURSE PRACTITIONER

## 2024-02-27 RX ORDER — PREDNISONE 20 MG/1
20 TABLET ORAL DAILY
Qty: 1 TABLET | Refills: 0 | Status: SHIPPED | OUTPATIENT
Start: 2024-03-06 | End: 2024-02-27 | Stop reason: HOSPADM

## 2024-02-27 RX ORDER — ATORVASTATIN CALCIUM 20 MG/1
20 TABLET, FILM COATED ORAL NIGHTLY
Qty: 30 TABLET | Refills: 0 | Status: SHIPPED | OUTPATIENT
Start: 2024-02-27 | End: 2024-03-28

## 2024-02-27 RX ORDER — PREDNISONE 10 MG/1
TABLET ORAL
Qty: 40 TABLET | Refills: 0 | Status: SHIPPED | OUTPATIENT
Start: 2024-02-27 | End: 2024-03-08

## 2024-02-27 RX ORDER — PREDNISONE 20 MG/1
60 TABLET ORAL DAILY
Status: DISCONTINUED | OUTPATIENT
Start: 2024-02-27 | End: 2024-02-27 | Stop reason: HOSPADM

## 2024-02-27 RX ORDER — PANTOPRAZOLE SODIUM 20 MG/1
20 TABLET, DELAYED RELEASE ORAL
Status: DISCONTINUED | OUTPATIENT
Start: 2024-02-27 | End: 2024-02-27 | Stop reason: HOSPADM

## 2024-02-27 RX ORDER — PREDNISONE 10 MG/1
10 TABLET ORAL DAILY
Status: DISCONTINUED | OUTPATIENT
Start: 2024-03-07 | End: 2024-02-27 | Stop reason: HOSPADM

## 2024-02-27 RX ORDER — PREDNISONE 20 MG/1
20 TABLET ORAL DAILY
Status: DISCONTINUED | OUTPATIENT
Start: 2024-02-27 | End: 2024-02-27

## 2024-02-27 RX ORDER — PREDNISONE 20 MG/1
60 TABLET ORAL DAILY
Qty: 12 TABLET | Refills: 0 | Status: SHIPPED | OUTPATIENT
Start: 2024-02-28 | End: 2024-02-27 | Stop reason: HOSPADM

## 2024-02-27 RX ORDER — PREDNISONE 10 MG/1
5 TABLET ORAL DAILY
Status: DISCONTINUED | OUTPATIENT
Start: 2024-03-08 | End: 2024-02-27 | Stop reason: HOSPADM

## 2024-02-27 RX ORDER — PREDNISONE 10 MG/1
5 TABLET ORAL DAILY
Status: DISCONTINUED | OUTPATIENT
Start: 2024-03-07 | End: 2024-02-27

## 2024-02-27 RX ORDER — ASPIRIN 81 MG/1
81 TABLET ORAL DAILY
Qty: 30 TABLET | Refills: 0 | Status: SHIPPED | OUTPATIENT
Start: 2024-02-28 | End: 2024-03-29

## 2024-02-27 RX ORDER — PANTOPRAZOLE SODIUM 20 MG/1
20 TABLET, DELAYED RELEASE ORAL
Status: DISCONTINUED | OUTPATIENT
Start: 2024-02-28 | End: 2024-02-27

## 2024-02-27 RX ORDER — PREDNISONE 10 MG/1
15 TABLET ORAL DAILY
Status: DISCONTINUED | OUTPATIENT
Start: 2024-03-01 | End: 2024-02-27

## 2024-02-27 RX ORDER — AMLODIPINE BESYLATE 5 MG/1
5 TABLET ORAL DAILY
Qty: 30 TABLET | Refills: 0 | Status: SHIPPED | OUTPATIENT
Start: 2024-02-28 | End: 2024-03-29

## 2024-02-27 RX ORDER — PREDNISONE 20 MG/1
20 TABLET ORAL DAILY
Status: DISCONTINUED | OUTPATIENT
Start: 2024-03-06 | End: 2024-02-27 | Stop reason: HOSPADM

## 2024-02-27 RX ORDER — MECLIZINE HYDROCHLORIDE 25 MG/1
25 TABLET ORAL 3 TIMES DAILY PRN
Qty: 30 TABLET | Refills: 0 | Status: SHIPPED | OUTPATIENT
Start: 2024-02-27

## 2024-02-27 RX ORDER — PREDNISONE 20 MG/1
40 TABLET ORAL DAILY
Status: DISCONTINUED | OUTPATIENT
Start: 2024-03-04 | End: 2024-02-27 | Stop reason: HOSPADM

## 2024-02-27 RX ORDER — PREDNISONE 10 MG/1
10 TABLET ORAL DAILY
Status: DISCONTINUED | OUTPATIENT
Start: 2024-03-04 | End: 2024-02-27

## 2024-02-27 RX ADMIN — MECLIZINE HYDROCHLORIDE 25 MG: 25 TABLET ORAL at 19:34

## 2024-02-27 RX ADMIN — ICOSAPENT ETHYL 1000 MG: 1 CAPSULE ORAL at 08:45

## 2024-02-27 RX ADMIN — Medication 1000 UNITS: at 08:46

## 2024-02-27 RX ADMIN — AMLODIPINE BESYLATE 5 MG: 5 TABLET ORAL at 08:46

## 2024-02-27 RX ADMIN — PRIMIDONE 50 MG: 50 TABLET ORAL at 08:44

## 2024-02-27 RX ADMIN — PANTOPRAZOLE SODIUM 20 MG: 20 TABLET, DELAYED RELEASE ORAL at 14:12

## 2024-02-27 RX ADMIN — PREDNISONE 60 MG: 20 TABLET ORAL at 14:12

## 2024-02-27 RX ADMIN — FLUTICASONE FUROATE AND VILANTEROL TRIFENATATE 1 PUFF: 100; 25 POWDER RESPIRATORY (INHALATION) at 07:16

## 2024-02-27 RX ADMIN — Medication 1 TABLET: at 08:45

## 2024-02-27 RX ADMIN — LEVOTHYROXINE SODIUM 75 MCG: 0.07 TABLET ORAL at 06:50

## 2024-02-27 RX ADMIN — ASPIRIN 81 MG: 81 TABLET, COATED ORAL at 08:46

## 2024-02-27 RX ADMIN — MECLIZINE HYDROCHLORIDE 25 MG: 25 TABLET ORAL at 08:45

## 2024-02-27 RX ADMIN — ENOXAPARIN SODIUM 40 MG: 40 INJECTION SUBCUTANEOUS at 03:26

## 2024-02-27 ASSESSMENT — COGNITIVE AND FUNCTIONAL STATUS - GENERAL
CLIMB 3 TO 5 STEPS WITH RAILING: A LITTLE
WALKING IN HOSPITAL ROOM: A LITTLE
MOBILITY SCORE: 20
MOVING TO AND FROM BED TO CHAIR: A LITTLE
STANDING UP FROM CHAIR USING ARMS: A LITTLE

## 2024-02-27 ASSESSMENT — PAIN - FUNCTIONAL ASSESSMENT: PAIN_FUNCTIONAL_ASSESSMENT: 0-10

## 2024-02-27 ASSESSMENT — PAIN SCALES - GENERAL
PAINLEVEL_OUTOF10: 0 - NO PAIN
PAINLEVEL_OUTOF10: 0 - NO PAIN

## 2024-02-27 NOTE — SIGNIFICANT EVENT
Notified by RN that attending Dr. Siu is requesting patient to be discharged home.  Discharge orders placed, Rx sent to preferred pharmacy.

## 2024-02-27 NOTE — PROGRESS NOTES
Physical Therapy    Physical Therapy Treatment    Patient Name: Camryn Garcia  MRN: [id number]  Today's Date: 2/27/2024  Time Calculation  Start Time: 1339  Stop Time: 1402  Time Calculation (min): 23 min       Assessment/Plan         PT Plan  Treatment/Interventions: Transfer training, Gait training, Strengthening  PT Plan: Skilled PT  PT Frequency: 3 times per week  PT Discharge Recommendations: Low intensity level of continued care  PT - OK to Discharge: Yes      General Visit Information:   PT  Visit  PT Received On: 02/27/24  General  Prior to Session Communication: Bedside nurse (Pt cleared by RN to participate in Physical Therapy.)  General Comment: Pt states he is still dizzy, but improved compared to yesterday. Per neuro NP, pt being treated for vestibular neuritis    Subjective     Objective   Pain:  Pain Assessment  Pain Assessment: 0-10  Pain Score: 0 - No pain     Treatments:  Bed Mobility  Bed Mobility:  (not tested, pt sitting up  in recliner at start of session)    Ambulation/Gait Training  Ambulation/Gait Training Performed:  (Pt takes 3 steps arm in arm, immediate LOB to the R requiring mod A.Pt amb 40' with cane and min A overall, mult LOBs with poor righting reactions. Pt also amb 40' with FWW with CGA, significant improvement in steadiness. Educated on device/safety.)  Transfers  Transfer:  (Sit/stand transfers with CGA.)    Outcome Measures:  [facility] Basic Mobility  Turning from your back to your side while in a flat bed without using bedrails: None  Moving from lying on your back to sitting on the side of a flat bed without using bedrails: None  Moving to and from bed to chair (including a wheelchair): A little  Standing up from a chair using your arms (e.g. wheelchair or bedside chair): A little  To walk in hospital room: A little  Climbing 3-5 steps with railing: A little  Basic Mobility - Total Score: 20    Education Documentation  Mobility Training, taught by Meli Han, PT at  2/27/2024  2:37 PM.  Learner: Patient  Readiness: Acceptance  Method: Explanation  Response: Verbalizes Understanding    Education Comments  Pt educated on balance interventions, including balance systems, and how balance relates to safety, mobility, and function.       Encounter Problems       Encounter Problems (Active)       PT Problem       PT Goal 1 (Progressing)       Start:  02/26/24    Expected End:  03/11/24       Indep txs          PT Goal 2 (Progressing)       Start:  02/26/24    Expected End:  03/11/24       Mod indep/s amb w/ ad if needed 150ft x3         PT Goal 3 (Progressing)       Start:  02/26/24    Expected End:  03/11/24       20-30 reps ble there ex            Pain - Adult

## 2024-02-27 NOTE — PROGRESS NOTES
"Camryn Garcia is a 83 y.o. male on day 0 of admission presenting with Dizziness.      Subjective   MRI of the brain without contrast is negative for any acute findings of CVA or hemorrhage.  Patient to be treated for vestibular neuritis with 10-day course of prednisone taper and to follow-up with physical therapy in the outpatient setting for vestibular rehabilitation.  Referral has been sent.  Neurology to sign off on care.       Objective     Last Recorded Vitals  Blood pressure 159/83, pulse 64, temperature 36.5 °C (97.7 °F), resp. rate 20, height 1.753 m (5' 9\"), weight 72.6 kg (160 lb), SpO2 92 %.  Physical exam/neurological exam  Patient seen and examined at this time; upon entering room he is resting quietly in bed side chair. Appears fully developed and well nourished.   Mental status: A&Ox 3. Memory testing, fund of knowledge and concentration within normal limits. Speech is fluent and negative for any paraphrasic errors.     Cranial Nerves:  Optic II/ Oculomotor III: Fundoscopic exam was technically difficult. PERRL +2. Visual fields are full. Convergence and accomodation noted without difficulty. Negative for deficits to visual acuity confrontation via static-finger wiggle test. Eyes appear aligned and free of exophthalmos and ptosis. Sclera are white bilaterally and lens are free from clouding.   Oculomotor III/ Trochlear IV/ Abducens VI: Extraocular movements are full, with no evidence of nystagmus. Negative for diplopia.  Head thrust test positive to left side.  Trigeminal V: Facial sensation is intact to light touch. Corneal reflex responsive when threatened bilaterally.  Facial VII: intact; nose is midline, with no evidence of flattening to nasolabial folds noted and mouth is negative for evidence of droop. Patient is successfully able to follow commands to raise eyebrows, squeeze eyes shut, smile and show teeth, frown, and puff out cheeks.   Acoustic VIII: Hearing is intact " bilaterally.  Glossopharngyeal IX/ Vagus X: Palate elevates symmetrically to phonation. Findings are negative for uvula deviation or dysphagia.   Spinal accessory XI: Sternocleidomastoid/ upper trapezius is 5/5 to strength testing. No asymmetry noted to strength, bulk, or tone.   Hypoglossal XII: Tongue is midline and without deviations. Phonation is articulate and is negative for findings of dysarthria or aphasia.     Motor exam: negative for evidence of involuntary movements or fasiculations. BUE flexion of biceps and brachioradialis graded 5/5, in addition to extension of triceps at elbow and wrists. BUE  strength 5/5, along with finger abduction and thumb opposition. BLE hip flexion, extension, adduction, and abduction 5/5. Knee extension & flexion 5/5. Ankle dorsiflexion and plantarflexion 5/5. Normal bulk and normal tone.     Sensory exam: Sensation is intact to light touch throughout.    Reflexes: Reflexes are 2+ and symmetric. Bilateral plantar responses are flexor. Ankle jerks symmetric.     Coordination: finger-to-nose testing is negative for signs of dysmetria. Pronator drift testing to BUE negative. Rapid alternating hand movements WNL.    Gait exam: Negative for ataxia at this time but patient does state when he was walking with staff earlier today he did feel off balance    Relevant Results  Scheduled medications  allopurinol, 100 mg, oral, Nightly  amLODIPine, 5 mg, oral, Daily  aspirin, 81 mg, oral, Daily  atorvastatin, 20 mg, oral, Nightly  cholecalciferol, 1,000 Units, oral, Daily  cyanocobalamin, 1,000 mcg, oral, Daily  docusate sodium, 100 mg, oral, BID  enoxaparin, 40 mg, subcutaneous, q24h  fluticasone furoate-vilanteroL, 1 puff, inhalation, Daily  icosapent ethyL, 1,000 mg, oral, Daily  [START ON 3/2/2024] levothyroxine, 150 mcg, oral, Once per day on Sun Sat  levothyroxine, 75 mcg, oral, Once per day on Mon Tue Wed Thu Fri  montelukast, 10 mg, oral, Nightly  multivitamin with minerals, 1  tablet, oral, Daily  polyethylene glycol, 17 g, oral, Daily  predniSONE, 20 mg, oral, Daily   Followed by  [START ON 3/1/2024] predniSONE, 15 mg, oral, Daily   Followed by  [START ON 3/4/2024] predniSONE, 10 mg, oral, Daily   Followed by  [START ON 3/7/2024] predniSONE, 5 mg, oral, Daily  primidone, 50 mg, oral, BID      Continuous medications     PRN medications  PRN medications: acetaminophen, albuterol, meclizine  ECG 12 lead    Result Date: 2/27/2024  Sinus bradycardia Prolonged WY interval Left axis deviation Abnormal R-wave progression, early transition    MR brain wo IV contrast    Result Date: 2/26/2024  Interpreted By:  Otilio Zavala,  and Tone Raines STUDY: MR BRAIN WO IV CONTRAST;  2/26/2024 3:01 pm   INDICATION: Signs/Symptoms:Loss of balance, dizziness..   COMPARISON: CTA of the head 02/26/2024.   ACCESSION NUMBER(S): [id number]   ORDERING CLINICIAN: NORIS OTERO   TECHNIQUE: Noncontrast axial T2, FLAIR, DWI, gradient echo T2 and T1 weighted images of the brain were acquired.   FINDINGS: Ventricles, sulci and basal cisterns are diffusely prominent commensurate with moderate parenchymal volume loss. There is no abnormal extra-axial fluid collection.   There is no restricted diffusion to suggest acute infarction. There is a mild burden of nonspecific T2 and FLAIR hyperintense signal throughout the supratentorial white matter compatible with small-vessel ischemic change. Regions of increased T2 signal within the bilateral basal ganglia may represent prominent perivascular spaces versus chronic lacunar infarcts. There is no evidence of acute intracranial hemorrhage. There is no mass effect or midline shift. The major intracranial flow voids are patent.   The right lens has been surgically replaced. Mucosal thickening is noted throughout the visualized paranasal sinuses. Mastoid air cells are clear.       No acute infarct, intracranial mass effect or midline shift.   Moderate parenchymal volume loss  and nonspecific white matter signal abnormality compatible with small-vessel ischemic disease given the patient's age.   I personally reviewed the images/study and I agree with the findings as stated by Dr. Wayne.   MACRO: None   Signed by: Otilio Zavala 2/26/2024 4:10 PM Dictation workstation:   [id number]    CT angio head and neck w and wo IV contrast    Result Date: 2/26/2024  Interpreted By:  Marychuy Perez, STUDY: CT HEAD WO IV CONTRAST; CT ANGIO HEAD AND NECK W AND WO IV CONTRAST; 2/26/2024 2:49 am   INDICATION: Signs/Symptoms:dizzy   COMPARISON: Noncontrast head CT 03/04/2022   ACCESSION NUMBER(S): [id number]; [id number]   ORDERING CLINICIAN: SIMÓN ELLIOTT   TECHNIQUE: Multiple contiguous axial noncontrast images of the head were obtained. Following IV contrast administration, a CT angiography of the head  and neck was performed.  Triplanar MIPS  and 3D reconstructions of the [place]  and neck were generated.   FINDINGS: NON-CONTRAST HEAD CT:   No acute intracranial hemorrhage, mass-effect, evidence of large vessel ischemic infarct, or extra-axial fluid collection. Gray-white matter distinction is preserved.   No mass-effect, midline shift, sulcal effacement, or effacement of the basal cisterns.   The brain parenchymal volume and ventricles are age-appropriate. No hydrocephalus.   No acute skull fracture. The visualized orbits  are intact. The visualized paranasal sinuses show  chronic opacification of the right frontal sinus and mucosal thickening in the ethmoid air cells. Mild mucosal thickening in the maxillary sinuses. The mastoids are clear.     CTA NECK:   The great vessel origins are patent with no significant stenosis.   LEFT VERTEBRAL ARTERY:  No hemodynamically significant stenosis, occlusion, or dissection.   LEFT COMMON/INTERNAL CAROTID ARTERY:  Mild calcification of the proximal internal carotid artery. Noncalcified plaque extending a length of approximately 1.7 cm in the proximal  internal carotid artery resulting in 35% stenosis.   RIGHT VERTEBRAL ARTERY:  No hemodynamically significant stenosis, occlusion, or dissection.   RIGHT COMMON/INTERNAL CAROTID ARTERY: Mild calcification of the proximal internal carotid artery. No hemodynamically significant stenosis, occlusion, or dissection.     The neck soft tissues show no evidence of mass, fluid collection, or enlarged lymph nodes.   There is no acute osseous abnormality. The imaged lungs are clear. Mild-to-moderate cervical degenerative disc changes.     CTA HEAD:   ANTERIOR CIRCULATION: No hemodynamically significant intracranial stenosis, occlusion, or aneurysm.   - Internal Carotid Arteries: Patent with no hemodynamically significant stenosis.   - Middle Cerebral Arteries: Patent with no hemodynamically significant stenosis.   - Anterior Cerebral Arteries: Patent with no hemodynamically significant stenosis.     POSTERIOR CIRCULATION: No hemodynamically significant intracranial stenosis, occlusion, or aneurysm.   - Intracranial Vertebral Arteries: Patent with no hemodynamically significant stenosis.   - Basilar Artery: Patent with no hemodynamically significant stenosis.   - Posterior Cerebral Arteries: Patent with no hemodynamically significant stenosis.   No arteriovenous malformation is visualized. No pathologic intracranial enhancement or discrete mass. The dural venous sinuses are patent.   MIPS and 3D reconstructions confirm the above findings.       1. No intracranial major arterial branch occlusion or hemodynamically significant stenosis.   2. Noncalcified plaque in the proximal left internal carotid artery resulting in 35% stenosis. No significant stenosis of the right carotid or vertebral arteries.   3. No acute intracranial abnormality.     MACRO: None   Signed by: Marychuy Perez 2/26/2024 3:49 AM Dictation workstation:   [id number]    CT head wo IV contrast    Result Date: 2/26/2024  Interpreted By:  Marychuy Perez, STUDY: CT  HEAD WO IV CONTRAST; CT ANGIO HEAD AND NECK W AND WO IV CONTRAST; 2/26/2024 2:49 am   INDICATION: Signs/Symptoms:dizzy   COMPARISON: Noncontrast head CT 03/04/2022   ACCESSION NUMBER(S): [id number]; [id number]   ORDERING CLINICIAN: SIMÓN ELLIOTT   TECHNIQUE: Multiple contiguous axial noncontrast images of the head were obtained. Following IV contrast administration, a CT angiography of the head  and neck was performed.  Triplanar MIPS  and 3D reconstructions of the [place]  and neck were generated.   FINDINGS: NON-CONTRAST HEAD CT:   No acute intracranial hemorrhage, mass-effect, evidence of large vessel ischemic infarct, or extra-axial fluid collection. Gray-white matter distinction is preserved.   No mass-effect, midline shift, sulcal effacement, or effacement of the basal cisterns.   The brain parenchymal volume and ventricles are age-appropriate. No hydrocephalus.   No acute skull fracture. The visualized orbits  are intact. The visualized paranasal sinuses show  chronic opacification of the right frontal sinus and mucosal thickening in the ethmoid air cells. Mild mucosal thickening in the maxillary sinuses. The mastoids are clear.     CTA NECK:   The great vessel origins are patent with no significant stenosis.   LEFT VERTEBRAL ARTERY:  No hemodynamically significant stenosis, occlusion, or dissection.   LEFT COMMON/INTERNAL CAROTID ARTERY:  Mild calcification of the proximal internal carotid artery. Noncalcified plaque extending a length of approximately 1.7 cm in the proximal internal carotid artery resulting in 35% stenosis.   RIGHT VERTEBRAL ARTERY:  No hemodynamically significant stenosis, occlusion, or dissection.   RIGHT COMMON/INTERNAL CAROTID ARTERY: Mild calcification of the proximal internal carotid artery. No hemodynamically significant stenosis, occlusion, or dissection.     The neck soft tissues show no evidence of mass, fluid collection, or enlarged lymph nodes.   There is no acute  osseous abnormality. The imaged lungs are clear. Mild-to-moderate cervical degenerative disc changes.     CTA HEAD:   ANTERIOR CIRCULATION: No hemodynamically significant intracranial stenosis, occlusion, or aneurysm.   - Internal Carotid Arteries: Patent with no hemodynamically significant stenosis.   - Middle Cerebral Arteries: Patent with no hemodynamically significant stenosis.   - Anterior Cerebral Arteries: Patent with no hemodynamically significant stenosis.     POSTERIOR CIRCULATION: No hemodynamically significant intracranial stenosis, occlusion, or aneurysm.   - Intracranial Vertebral Arteries: Patent with no hemodynamically significant stenosis.   - Basilar Artery: Patent with no hemodynamically significant stenosis.   - Posterior Cerebral Arteries: Patent with no hemodynamically significant stenosis.   No arteriovenous malformation is visualized. No pathologic intracranial enhancement or discrete mass. The dural venous sinuses are patent.   MIPS and 3D reconstructions confirm the above findings.       1. No intracranial major arterial branch occlusion or hemodynamically significant stenosis.   2. Noncalcified plaque in the proximal left internal carotid artery resulting in 35% stenosis. No significant stenosis of the right carotid or vertebral arteries.   3. No acute intracranial abnormality.     MACRO: None   Signed by: Marychuy Perez 2/26/2024 3:49 AM Dictation workstation:   [id number]   Results for orders placed or performed during the hospital encounter of 02/26/24 (from the past 24 hour(s))   CBC   Result Value Ref Range    WBC 6.6 4.4 - 11.3 x10*3/uL    nRBC 0.0 0.0 - 0.0 /100 WBCs    RBC 5.43 4.50 - 5.90 x10*6/uL    Hemoglobin 11.9 (L) 13.5 - 17.5 g/dL    Hematocrit 38.2 (L) 41.0 - 52.0 %    MCV 70 (L) 80 - 100 fL    MCH 21.9 (L) 26.0 - 34.0 pg    MCHC 31.2 (L) 32.0 - 36.0 g/dL    RDW 16.4 (H) 11.5 - 14.5 %    Platelets 176 150 - 450 x10*3/uL   Basic Metabolic Panel   Result Value Ref Range     Glucose 113 (H) 74 - 99 mg/dL    Sodium 138 136 - 145 mmol/L    Potassium 4.1 3.5 - 5.3 mmol/L    Chloride 102 98 - 107 mmol/L    Bicarbonate 28 21 - 32 mmol/L    Anion Gap 12 10 - 20 mmol/L    Urea Nitrogen 17 6 - 23 mg/dL    Creatinine 1.04 0.50 - 1.30 mg/dL    eGFR 72 >60 mL/min/1.73m*2    Calcium 9.0 8.6 - 10.3 mg/dL   TSH   Result Value Ref Range    Thyroid Stimulating Hormone 3.08 0.44 - 3.98 mIU/L   Lipid Panel   Result Value Ref Range    Cholesterol 184 0 - 199 mg/dL    HDL-Cholesterol 39.3 mg/dL    Cholesterol/HDL Ratio 4.7     LDL Calculated 110 (H) <=99 mg/dL    VLDL 35 0 - 40 mg/dL    Triglycerides 174 (H) 0 - 149 mg/dL    Non HDL Cholesterol 145 0 - 149 mg/dL             NIH Stroke Scale  1A. Level of Consciousness: Alert, Keenly Responsive  1B. Ask Month and Age: Both Questions Right  1C. Blink Eyes & Squeeze Hands: Performs Both Tasks  2. Best Gaze: Normal  3. Visual: No Visual Loss  4. Facial Palsy: Normal Symmetrical Movements  5A. Motor - Left Arm: No Drift  5B. Motor - Right Arm: No Drift  6A. Motor - Left Leg: No Drift  6B. Motor - Right Leg: No Drift  7. Limb Ataxia: Absent  8. Sensory Loss: Normal  9. Best Language: No Aphasia  10. Dysarthria: Normal  11. Extinction and Inattention: No Abnormality  NIH Stroke Scale: 0           [place] Coma Scale  Best Eye Response: Spontaneous  Best Verbal Response: Oriented  Best Motor Response: Follows commands  Kate Coma Scale Score: 15                             Assessment/Plan      Principal Problem:    Dizziness  -Patient to begin 10-day course of prednisone taper for treatment of vestibular neuritis.  Will add Prilosec to this regimen to assist with GI discomfort commonly associated with prednisone.  Taper will consist of 60 mg p.o. daily x 5 days, with patient to decrease his medication by 10 mg on a daily basis until he reaches 5 mg on day 10.  -Referral sent to physical therapy for vestibular rehabilitation in the outpatient setting  -Continue  promotion of lifestyle modifications, such as: Strict BP and glycemic control, dietary habit changes, incorporation of daily exercise regimen, adherence to all prescription/OTC medication schedules, attendance to all follow-up appointments, cessation from smoking if applicable, abstinence from alcohol and illicit drug use if applicable  -Continue aggressive rehydration in this patient to assist with symptoms.  Continue meclizine as needed.  -Patient to follow-up with PCP in 1 to 2 weeks postdischarge  -Patient to follow-up with Dr. David Belcher-ENT-within 1 month    Total face-to-face time spent with patient today was approximately 30 minutes; more than 50% of my time was spent counseling patient on: preparation to see patient via chart review of resulted laboratory values, radiographic imaging, prescribed medications, and impairment of involved organ systems. Time also spent on medical examination, placing appropriate orders for testing/medications, communicating with other health care providers, counseling/educating the patient/family, and care coordination.    Neurology to sign off at this time. Thank you for the opportunity to be a part of this patient's multidisciplinary treatment team.  If any additional questions or concerns arise, please do not hesitate to contact me or the on-call neurologist via [employer].    *This note was created using voice recognition transcription software. Despite proofreading, unintentional typographical errors may be present. Please contact the department of neurology with any questions or concerns.         HEATHER Epstein-CNP

## 2024-02-28 LAB
EST. AVERAGE GLUCOSE BLD GHB EST-MCNC: 128 MG/DL
HBA1C MFR BLD: 6.1 %

## 2024-02-28 NOTE — DISCHARGE SUMMARY
Discharge Diagnosis  #1/Dizziness/vestibular neuronitis/benign positional vertigo/his symptoms have much improved today.  Patient states he is having less vertigo and is ambulating with his cane.  He was seen by neurology this morning and they have started him on a tapering dose of prednisone.  He has also been advised to follow-up in the ENT clinic with Dr. Belcher and referral done in 1 month.  Clinically patient is doing much better.  His MRI of the brain done yesterday showed no acute infarct send showed suspected chronic small lacunar infarct in the basal ganglia and he was started on aspirin 81 mg p.o. daily and statin treatment.  Blood pressure has improved with adding amlodipine 5 mg p.o. daily.  #2/uncontrolled hypertension and patient has been started on amlodipine 5 mg p.o. daily during this admission and advised on no added salt diet and blood pressure systolic prior to discharge is improved in the 130s.  3./Anemia of iron deficiency and history of thalassemia minor followed by Dr. Francisco from hematology q. yearly.  4./Hypothyroidism and patient remains on levothyroxine.  5./Dyslipidemia and LDL is 110 patient was started on atorvastatin 20 mg p.o. daily on this admission.  Fasting lipids and LFTs will be checked in 3 to 4 months.  6./hX Bilateral knee replacement/physical therapy was given to the patient.  And patient is ambulating with a cane.  7./History of benign essential stammers for which she remains on primidone.  8./History of gout.  No recent flareups.  Clinical condition at the time of discharge is improving and stable.  His wife is present in the room and case and discharge planning discussed with the patient and the wife.  Patient advised that our office will call him on Thursday to schedule an appointment to see me next week.  All questions answered.  Total time spent 36 minutes/time spent on patient interaction/discussing plan of care with the wife/counseling/assessment and plan and  documentation.  Issues Requiring Follow-Up      Discharge Meds     Your medication list        START taking these medications        Instructions Last Dose Given Next Dose Due   amLODIPine 5 mg tablet  Commonly known as: Norvasc  Start taking on: February 28, 2024      Take 1 tablet (5 mg) by mouth once daily. Do not start before February 28, 2024.       aspirin 81 mg EC tablet  Start taking on: February 28, 2024      Take 1 tablet (81 mg) by mouth once daily. Do not start before February 28, 2024.       atorvastatin 20 mg tablet  Commonly known as: Lipitor      Take 1 tablet (20 mg) by mouth once daily at bedtime.       meclizine 25 mg tablet  Commonly known as: Antivert      Take 1 tablet (25 mg) by mouth 3 times a day as needed for dizziness.       predniSONE 10 mg tablet  Commonly known as: Deltasone  Start taking on: February 27, 2024      Take 6 tablets (60 mg) by mouth once daily for 4 days, THEN 5 tablets (50 mg) once daily for 1 day, THEN 4 tablets (40 mg) once daily for 1 day, THEN 3 tablets (30 mg) once daily for 1 day, THEN 2 tablets (20 mg) once daily for 1 day, THEN 1 tablet (10 mg) once daily for 1 day, THEN 0.5 tablets (5 mg) once daily for 1 day.              CONTINUE taking these medications        Instructions Last Dose Given Next Dose Due   Advair Diskus 100-50 mcg/dose diskus inhaler  Generic drug: fluticasone propion-salmeteroL           albuterol 90 mcg/actuation inhaler           allopurinol 100 mg tablet  Commonly known as: Zyloprim           cholecalciferol 25 MCG (1000 UT) capsule  Commonly known as: Vitamin D-3           cyanocobalamin (vitamin B-12) 1,000 mcg tablet, sublingual           Daily Multi-Vitamin tablet  Generic drug: multivitamin           levothyroxine 75 mcg tablet  Commonly known as: Synthroid, Levoxyl           montelukast 10 mg tablet  Commonly known as: Singulair           omega-3 300-1,000 mg capsule  Commonly known as: Fish Oil           primidone 50 mg  tablet  Commonly known as: Mysoline                     Where to Get Your Medications        These medications were sent to [id number] - [facility], [place] - [address] AT [facility] & [facility]  [address], [facility] [address]      Phone: [phone]   amLODIPine 5 mg tablet  aspirin 81 mg EC tablet  atorvastatin 20 mg tablet  meclizine 25 mg tablet  predniSONE 10 mg tablet         Test Results Pending At Discharge  Pending Labs       Order Current Status    Hemoglobin A1C In process            Hospital Course   This is a pleasant 83 years old male with history of hypothyroidism, chronic anemia and history of thalassemia minor/history of iron deficiency anemia, history of gout, 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  This was associated with 1 or 2 bouts of emesis.  Patient initially in the ED had a CT scan of the brain which was negative for acute bleeds and infarcts.  CTA of the neck showed no significant stenosis.  His blood pressure was elevated systolic in the 150s 160 during his admission.  He was started on amlodipine 5 mg p.o. daily.  Neurology consultation was obtained.  Patient underwent an MRI of the brain without contrast which showed no acute infarcts and chronic small vessel disease and radiology read the report as suspected old lacunar basal ganglia infarcts.  Patient was started on aspirin 81 mg p.o. daily and also started on atorvastatin 20 mg p.o. daily.  His LDL is 110.  Neurology recommended that patient be started on tapering dose of prednisone for his vertigo and vestibular neuronitis and follow-up in the ENT clinic and vestibular clinic as an outpatient if his symptoms persisted.  Patient's clinical symptoms much improved.  He was given physical therapy and he was  ambulating with a cane and his dizziness was improving.  He also was started on meclizine 25 mg p.o. every 8 hours.  Patient was discharged home in stable condition to have follow-up with me in the office in 1 week.  Clinical condition at the time of discharge was much improved.  Medications reconciled.  Labs and blood work noted.  Neurology results noted.    Pertinent Physical Exam At Time of Discharge  Physical Exam  Vitals and nursing note reviewed.   Constitutional:       General: He is not in acute distress.     Appearance: Normal appearance. He is normal weight. He is not ill-appearing or toxic-appearing.   HENT:      Head: Normocephalic and atraumatic.      Right Ear: Tympanic membrane and ear canal normal. There is no impacted cerumen.      Left Ear: Tympanic membrane, ear canal and external ear normal.      Nose: Nose normal. No congestion or rhinorrhea.      Mouth/Throat:      Pharynx: Oropharynx is clear. No oropharyngeal exudate or posterior oropharyngeal erythema.   Eyes:      General: No scleral icterus.        Right eye: No discharge.         Left eye: No discharge.      Extraocular Movements: Extraocular movements intact.      Conjunctiva/sclera: Conjunctivae normal.      Pupils: Pupils are equal, round, and reactive to light.   Neck:      Vascular: No carotid bruit.   Cardiovascular:      Rate and Rhythm: Normal rate and regular rhythm.      Pulses: Normal pulses.      Heart sounds: Normal heart sounds. No murmur heard.     No gallop.   Pulmonary:      Effort: Pulmonary effort is normal. No respiratory distress.      Breath sounds: Normal breath sounds. No wheezing, rhonchi or rales.   Abdominal:      General: Abdomen is flat. Bowel sounds are normal. There is no distension.      Palpations: Abdomen is soft. There is no mass.      Tenderness: There is no abdominal tenderness. There is no right CVA tenderness, left CVA tenderness, guarding or rebound.      Hernia: No hernia is present.    Musculoskeletal:         General: No swelling or tenderness. Normal range of motion.      Cervical back: Normal range of motion and neck supple. No rigidity.      Right lower leg: No edema.      Left lower leg: No edema.   Lymphadenopathy:      Cervical: No cervical adenopathy.   Skin:     General: Skin is warm.      Coloration: Skin is not jaundiced.      Findings: No erythema or rash.   Neurological:      General: No focal deficit present.      Mental Status: He is alert and oriented to person, place, and time. Mental status is at baseline.      Sensory: No sensory deficit.      Motor: No weakness.      Gait: Gait normal.      Deep Tendon Reflexes: Reflexes normal.   Psychiatric:         Mood and Affect: Mood normal.         Thought Content: Thought content normal.         Judgment: Judgment normal.         Outpatient Follow-Up  Future Appointments   Date Time Provider Department Center   9/24/2024 11:30 AM Shree Francisco MD [id number] [place]         Shay Siu MD

## 2024-02-28 NOTE — CARE PLAN
The patient's goals for the shift include      The clinical goals for the shift include No dizziness      Problem: Pain - Adult  Goal: Verbalizes/displays adequate comfort level or baseline comfort level  2/27/2024 1911 by David Figueroa RN  Outcome: Met  2/27/2024 0857 by David Figueroa RN  Outcome: Progressing     Problem: Safety - Adult  Goal: Free from fall injury  2/27/2024 1911 by David Figueroa RN  Outcome: Met  2/27/2024 0857 by David Figueroa RN  Outcome: Progressing     Problem: Discharge Planning  Goal: Discharge to home or other facility with appropriate resources  2/27/2024 1911 by David Figueroa RN  Outcome: Met  2/27/2024 0857 by David Figueroa RN  Outcome: Progressing     Problem: Chronic Conditions and Co-morbidities  Goal: Patient's chronic conditions and co-morbidity symptoms are monitored and maintained or improved  2/27/2024 1911 by David Figueroa RN  Outcome: Met  2/27/2024 0857 by David Figueroa RN  Outcome: Progressing     Problem: Fall/Injury  Goal: Not fall by end of shift  2/27/2024 1911 by David Figueroa RN  Outcome: Met  2/27/2024 0857 by David Figueroa RN  Outcome: Progressing  Goal: Be free from injury by end of the shift  2/27/2024 1911 by David Figueroa RN  Outcome: Met  2/27/2024 0857 by David Figueroa RN  Outcome: Progressing

## 2024-03-13 NOTE — PROGRESS NOTES
PHYSICAL THERAPY EVALUATION AND TREATMENT    Patient Name: Camryn Garcia  MRN: [id number]  Today's Date: 3/15/2024  Time Calculation  Start Time: 1425  Stop Time: 1514  Time Calculation (min): 49 min    Insurance:  Visit number: 1 (updated 03/15/24)   Insurance Type: Payor: MEDICARE / Plan: MEDICARE PART A AND B / Product Type: *No Product type* /   Certification Period Start Date: 03/15/24  Certification Period End Date: 06/13/24  Referred by: Magalis Jones APRN-*     PT Evaluation Time Entry  PT Evaluation (Low) Time Entry: 27  PT Therapeutic Procedures Time Entry  Therapeutic Activity Time Entry: 10  PT Modalities Time Entry  Canalith Repositioning Time Entry: 12                  Assessment:     Cmaryn Garcia  is a 83 y.o. old patient who participated in a physical therapy evaluation today due to dizziness. Positional testing positive for L posterior canalithiasis today with symptoms abolishing post CRP today reflecting positive response to session. However, did observe slight L beating nystagmus observed with goggles without fixation - will continue to monitor for potential vestibular hypofunction.  his impairments include: balance deficits and dizziness.  Due to these impairments, he has the following functional limitations and participation restrictions: Gait deviations, increased fall risk, difficulty with sleeping, stair negotiation, transfers, performing household/recreational activities, and performing some ADLS.  Skilled physical therapy services are appropriate and beneficial in order to achieve measurable and meaningful change in the above objective tests and measures. Utilization of skilled physical therapy services will aid in advancing his functional status and attaining his therapy-related goals. The patient verbalized understanding and is in agreement with all goals and plan of care.  Plan of care was developed with input and agreement by the patient  Rehab Potential: Good    Plan:  Treatment/Interventions: Canalith repositioning, Cryotherapy, Education/ Instruction, Electrical stimulation, Gait training, Manual therapy, Neuromuscular re-education, Therapeutic activities, Therapeutic exercises  PT Plan: Skilled PT  PT Frequency:  (1-2x per week)  Duration: for 10-12 weeks  Certification Period Start Date: 03/15/24  Certification Period End Date: 06/13/24  NV: assess response to CRP; monitor for potential vestibular hypofunction given slight L beating nystagmus observed with goggles without fixation, consider balance testing     Therapy Diagnosis:   1. Vestibular neuronitis, unspecified laterality  Referral to Physical Therapy    Follow Up In Physical Therapy          Subjective    Camryn Garcia  is a 83 y.o. male  presenting to the clinic with chief complaint of dizziness.  Per script: vestibular neuronitis .  Was in ED due to dizziness in Feb 2024: started on prednisone and new BP medication as his BP was considered to be uncontrolled.   Described dizziness as a spinning sensation - present when laying stationary too   Pt's wife present for session.   He was prescribed meclizine as well.   The last two days has been feeling better.   Reports his walking is not doing great - feels unsteady and does not walk straight.       Camryn Garcia  denies numbness, tingling, diplopia, drop attacks, dysarthria.     Pt goals for therapy: figure out dizziness and improve balance       Prior Treatment/diagnostic images:  MRI Brain 2/26/2024:   FINDINGS:  Ventricles, sulci and basal cisterns are diffusely prominent  commensurate with moderate parenchymal volume loss. There is no  abnormal extra-axial fluid collection.      There is no restricted diffusion to suggest acute infarction. There  is a mild burden of nonspecific T2 and FLAIR hyperintense signal  throughout the supratentorial white matter compatible with  small-vessel ischemic change. Regions of increased T2 signal within  the bilateral basal  "ganglia may represent prominent perivascular  spaces versus chronic lacunar infarcts. There is no evidence of acute  intracranial hemorrhage. There is no mass effect or midline shift.  The major intracranial flow voids are patent.      The right lens has been surgically replaced. Mucosal thickening is  noted throughout the visualized paranasal sinuses. Mastoid air cells  are clear.      IMPRESSION:  No acute infarct, intracranial mass effect or midline shift.      Moderate parenchymal volume loss and nonspecific white matter signal  abnormality compatible with small-vessel ischemic disease given the  patient's age.    Home Set up: lives with son and wife   PLOF: IND   CLOF: IND   Functional limitations: mild unsteadiness with walking     Work: retired     Medical History Form: Reviewed (scanned into chart)      Precautions:  Fall Risk: None   Denies: CA, pacemaker, DM, blood thinner medication use, latex allergy, epilepsy/seizures, or other known cardio/neuro/pulmonary problems   +anemia, thalassemia minor   +hypothyroidism   +essential tremors  +HTN  +Asthma  Previous surgeries include: B TKR - about 3 years ago     Objective   Vitals:   BP = 102/72  HR = 85       Vestibular:   Oculomotor Exam:   Convergence = WNL, mild \"funny feeling\"   Extraocular ROM = WNL   Smooth pursuits = WNL   Horizontal saccades = WNL   Vertical saccades = WNL   Spontaneous Nystagmus (with goggles) = slightly L beating nystagmus observed when looking to the L and the center, less evident with looking to the R   Gaze Evoked Nystagmus (with goggles) = negative   Post Head Shake Nystagmus Horizontal = not assessed     Positional Testing (tested with goggles)  [place]-Hallpike R/L: negative / symptomatic positive lasting < 30 seconds   Horizontal Roll Test R/L: negative / very mild symptomatic positive     Functional Mobility Assessment:   Gait: IND but mild sway observed at times   Transfers:  IND         Outcome Measures:  DHI = pt to bring back " nv     Treatments:  Therapeutic Activity    Educated pt regarding benefit and purpose of skilled PT services along with results of examination findings and how this correlates to their chief complaint.  -Explained relevant anatomy related to vestibular system and pt's chief complaint  -Discussed pathophysiology of BPPV  -Risk factors associated with increased prevalence of BPPV  -Education on purpose of CRP maneuvers and post-CRP restrictions and precautions (avoiding looking up/down, stay upright until bedtime)  -Pt to monitor dizziness/vertigo symptoms until next appointment to assess effectiveness of CRP done within session today  -Answered pt's questions in full      Canalith Repositioning  L modified Epley x 3 trials (includes time between CRP)     EDUCATION: see above     Goals:  Camryn S Jose will test negative for positional vertigo.    Camryn S Jose will report no complaint of positional imbalance or vertigo for one week with daily activities.    Camryn S Jose will decrease DHI by >/= 18 points (minimally clinically important difference) or </=34 points (16-34 Points is a mild handicap) in order to improve safety at home and decrease falls risk.     Camryn S Jose will perform 5x sit to  < 15 seconds to decrease fall risk.     Camryn S Jose will complete TUG within 12 seconds or less (indicative of higher fall risk) in order to INC balance and enhance safety during ADLs/ IADLs. (> or equal to 12 seconds indicates high risk for falls in older adults. 11-20 seconds is normal for the frail and elderly.)     Camryn S Jose will complete all 4 conditions of ModCTSIB for 30 secs with min to no sway to increase safety, decrease fall risk, and improve ability to complete functional activities.     Camryn S Jose will ambulate with IND on even surfaces for community distances without imbalance or major deviation to safely return to [facility] and enhance access to the community.    Camryn S  Jose will be able to ascend/descend > or equal to 8 steps with unilateral handrail reciprocally without difficulty in order for patient to be able to ambulate safely on all levels of home and in the community.    Camryn Acostath will demonstrate independence and report compliance with HEP to facilitate independent rehab program upon discharge.

## 2024-03-15 ENCOUNTER — CLINICAL SUPPORT (OUTPATIENT)
Dept: PHYSICAL THERAPY | Facility: CLINIC | Age: 84
End: 2024-03-15
Payer: MEDICARE

## 2024-03-15 DIAGNOSIS — H81.20 VESTIBULAR NEURONITIS, UNSPECIFIED LATERALITY: ICD-10-CM

## 2024-03-15 PROCEDURE — 97530 THERAPEUTIC ACTIVITIES: CPT | Mod: GP

## 2024-03-15 PROCEDURE — 97161 PT EVAL LOW COMPLEX 20 MIN: CPT | Mod: GP

## 2024-03-15 PROCEDURE — 95992 CANALITH REPOSITIONING PROC: CPT | Mod: GP

## 2024-03-15 ASSESSMENT — PATIENT HEALTH QUESTIONNAIRE - PHQ9
SUM OF ALL RESPONSES TO PHQ9 QUESTIONS 1 AND 2: 0
2. FEELING DOWN, DEPRESSED OR HOPELESS: NOT AT ALL
1. LITTLE INTEREST OR PLEASURE IN DOING THINGS: NOT AT ALL

## 2024-03-20 PROBLEM — H81.20 VESTIBULAR NEURONITIS: Status: ACTIVE | Noted: 2024-03-20

## 2024-03-20 NOTE — PROGRESS NOTES
"PHYSICAL THERAPY TREATMENT    Patient Name: Camryn Garcia  MRN: [id number]  Today's Date: 3/22/2024  Time Calculation  Start Time: 1023  Stop Time: 1048  Time Calculation (min): 25 min    Insurance:  Visit number: 2 (updated 03/22/24)   Insurance Type: Payor: MEDICARE / Plan: MEDICARE PART A AND B / Product Type: *No Product type* /   Certification Period Start Date: 03/15/24  Certification Period End Date: 06/13/24  Referred by: Magalis Jones APRN-*        PT Therapeutic Procedures Time Entry  Therapeutic Activity Time Entry: 12  PT Modalities Time Entry  Canalith Repositioning Time Entry: 13                  Assessment:   Pt brought back DHI with score of 20/100.  Progress towards goals: Good response to CRP last session with pt reporting feeling a reduction in his dizziness and an improvement in his balance. Positive L Nineveh Salcedo Dougherty present today still but symptoms lasted less than last visit and completely abolished with CRP today.   Pt response to tx: no INC in dizziness but \"off\" feeling present. he was able to exit clinic without INC in symptoms and no increased instability present.  Justification for skilled care: to continue to resolve dizziness and decrease fall risk     Plan:    assess response to CRP; monitor for potential vestibular hypofunction given slight L beating nystagmus observed with goggles without fixation, consider balance testing     Therapy Diagnosis:   1. Vestibular neuronitis, unspecified laterality  Follow Up In Physical Therapy          1023 - 1040   Subjective    Reports dizziness is better but he feels an improvement. Still having a couple incidents of dizziness in the AM though.   No falls since last visit. Feels his balance is better.   Pain: none     Precautions:  Fall Risk: None   Denies: CA, pacemaker, DM, blood thinner medication use, latex allergy, epilepsy/seizures, or other known cardio/neuro/pulmonary problems   +anemia, thalassemia minor   +hypothyroidism   +essential " tremors  +HTN  +Asthma  Previous surgeries include: B TKR - about 3 years ago     Objective   Vitals:   BP = 108/70  HR = 67      Treatments:  Therapeutic Activity    Positional Testing (tested without goggles, using loaded Damion HallPike)  [place]-Hallpike R/L: negative / symptomatic positive lasting < 15 seconds   Horizontal Roll Test R/L: negative / very mild symptomatic positive   Educated pt regarding benefit and purpose of skilled PT services along with results of examination findings and how this correlates to their chief complaint.  -Explained relevant anatomy related to vestibular system and pt's chief complaint  -Discussed pathophysiology of BPPV  -Education on purpose of CRP maneuvers and post-CRP restrictions and precautions (avoiding looking up/down, stay upright until bedtime)  -Pt to monitor dizziness/vertigo symptoms until next appointment to assess effectiveness of CRP done within session today  -Answered pt's questions in full      Canalith Repositioning  L modified Epley x 3 trials (includes time between CRP)     EDUCATION: see above

## 2024-03-21 NOTE — PROGRESS NOTES
AUDIOLOGY ADULT AUDIOMETRIC EVALUATION      Name:  Camryn Garcia   :  1940  Age:  83 y.o.  Date of Evaluation:  2024    Time: 8004-6213    IMPRESSIONS     Mild sloping to severe sensorineural hearing loss in both ears.  Word understanding in quiet is good in both ears.  Tympanometry indicates normal middle ear pressure and tympanic membrane mobility in both ears.    Today's test results are hearing loss requiring medical/otologic and audiologic follow-up.     Amplification needs: Continue full-time use of devices, expect when activities preclude device safety.    RECOMMENDATIONS     Continue medical follow up with PCP/ENT as recommended.  Return for audiologic evaluation in conjunction with medical management to monitor hearing sensitivity and assess middle ear status, or sooner should concerns arise.  Avoid exposure to loud sounds by moving away from the noise, turning down the volume, or wearing proper hearing protection correctly.  Consider use of good communication strategies. These include but are not limited to the following: get Camryn's attention before speaking to him, close the distance between Mary and who is speaking, limit background noise, allow Camryn access to visual cues (i.e. facial expressions/mouth movements, pictures, written instructions, etc.). When in situations where background noise cannot be avoided, position yourself so that the background noise is to your back, and you communication partner is seated in front of you, ideally with a quiet area or wall behind them.     HISTORY     Reason for visit:  Mr. Garcia is seen today for an initial audiologic evaluation at the request of HEATHER Klein.CNP due to dizziness, vestibular neuronitis, and vertigo. History obtained from patient report and chart review.     Change in Hearing: yes unsure which ear; most likely greater in left ear than right  Tinnitus: yes in both ears intermittent, non-bothersome, and described  "as ringing sensation. He does not hear the tinnitus when wearing his hearing aids. Tinnitus increased after vertigo attack.   Otalgia: denied  Aural Pressure/Fullness: denied  Recent Ear Infections/Illness: denied  Otorrhea: denied  Dizziness: yes, described as head spinning/could not walk, lasts for about one and a half weeks, and occurs once. He is almost back to 100%  Presented to ED on 2/26/2024 for sudden onset dizziness that began the night prior. Endorsed spinning sensation with vomiting.   Currently completing physical therapy for balance deficits and dizziness. Initial PT evaluation/treatment revealed left posterior canalithiasis with symptoms abolishing post canalith repositioning maneuver reflection positive response to session. It was noted that provider observed slight left beating nystagmus with goggles without fixation, which provider will continue to monitor for potential vestibular hypofunction.   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 He and his wife feel he gets good benefit from them.   Other Significant History: History of microcytic anemia treated via IV iron infusions in April 2018.  Falls within the last year: denied    EVALUATION     See scanned audiogram in \"Media\"     TEST RESULTS     Otoscopic Evaluation:  Right Ear: Ear canal clear with identifiable cone of light.  Left Ear: Ear canal clear with identifiable cone of light.    Tympanometry (226 Hz):  Right Ear: Type A, middle ear pressure and tympanic membrane compliance within normal limits.   Left Ear: Type A, middle ear pressure and tympanic membrane compliance within normal limits.     Acoustic Reflexes:   Right Ear: Screened at 1000 Hz, no response.   Left Ear: Screened " at 1000 Hz, no response.     Distortion Product Otoacoustic Emissions:  Right Ear: Did not test.  Left Ear:  Did not test.  Present OAEs suggest normal or near cochlear outer hair cell function for corresponding frequency region(s). Absent OAEs with normal middle ear function can be consistent with some degree of hearing loss.     Test technique:  Pure Tone Audiometry via insert earphones  Reliability:   good    Pure Tone Audiometry:    Right Ear: Mild sensorineural hearing loss for 125-250 Hz, sloping to severe through 8000 Hz.  Left Ear: Mild sensorineural hearing loss at 125 Hz, sloping to severe through 8000 Hz.    Speech Audiometry:   Right Ear:  Speech Reception Threshold (SRT) was obtained at 40 dB HL. This is in fair agreement with three frequency Pure Tone Average. Word Recognition scores were good (84%) in quiet when words were presented at 85 dB HL. These results are based on [facility] Auditory Test No.6 (NU-6) (N=25).   Left Ear:  Speech Reception Threshold (SRT) was obtained at 50 dB HL. This is in fair agreement with three frequency Pure Tone Average. Word Recognition scores were good (84%) in quiet when words were presented at 85 dB HL. These results are based on [facility] Auditory Test No.6 (NU-6) (N=25).     Comparison of today's results with previous test results: No previous results available.    PATIENT EDUCATION     Discussed results and recommendations with Mr. Garcia. Questions were addressed and the patient was encouraged to contact our department at [phone] should concerns arise.       JERRY Tian, CCC-A  Licensed Clinical Audiologist        Degree of   Hearing Sensitivity dB Range   Within Normal Limits (WNL) 0 - 20   Slight 25   Mild 26 - 40   Moderate 41 - 55   Moderately-Severe 56 - 70   Severe 71 - 90   Profound 91 +      KEY  CHL Conductive Hearing Loss   ECV Ear Canal Volume   HA Hearing Aid   NIHL Noise-Induced Hearing Loss   PTA Pure Tone  Average   SNHL Sensorineural Hearing Loss   TM Tympanic Membrane   WNL Within Normal Limits

## 2024-03-22 ENCOUNTER — TREATMENT (OUTPATIENT)
Dept: PHYSICAL THERAPY | Facility: CLINIC | Age: 84
End: 2024-03-22
Payer: MEDICARE

## 2024-03-22 DIAGNOSIS — H81.20 VESTIBULAR NEURONITIS, UNSPECIFIED LATERALITY: Primary | ICD-10-CM

## 2024-03-22 PROCEDURE — 95992 CANALITH REPOSITIONING PROC: CPT | Mod: GP

## 2024-03-22 PROCEDURE — 97530 THERAPEUTIC ACTIVITIES: CPT | Mod: GP

## 2024-03-27 PROBLEM — I10 HTN, GOAL BELOW 130/80: Status: ACTIVE | Noted: 2024-03-05

## 2024-03-27 PROBLEM — D64.9 ANEMIA: Status: ACTIVE | Noted: 2023-12-05

## 2024-03-27 PROBLEM — H81.23 VESTIBULAR NEURONITIS OF BOTH EARS: Status: ACTIVE | Noted: 2024-02-27

## 2024-03-27 PROBLEM — R73.9 HYPERGLYCEMIA, UNSPECIFIED: Status: ACTIVE | Noted: 2023-12-05

## 2024-03-27 RX ORDER — ENOXAPARIN SODIUM 100 MG/ML
40 INJECTION SUBCUTANEOUS
COMMUNITY
Start: 2024-02-26

## 2024-03-27 RX ORDER — ICOSAPENT ETHYL 1 G/1
1000 CAPSULE ORAL
COMMUNITY
Start: 2024-02-26

## 2024-03-27 RX ORDER — FLUTICASONE FUROATE AND VILANTEROL 100; 25 UG/1; UG/1
1 POWDER RESPIRATORY (INHALATION)
COMMUNITY
Start: 2024-02-27

## 2024-04-01 ENCOUNTER — OFFICE VISIT (OUTPATIENT)
Dept: OTOLARYNGOLOGY | Facility: CLINIC | Age: 84
End: 2024-04-01
Payer: MEDICARE

## 2024-04-01 ENCOUNTER — CLINICAL SUPPORT (OUTPATIENT)
Dept: AUDIOLOGY | Facility: CLINIC | Age: 84
End: 2024-04-01
Payer: MEDICARE

## 2024-04-01 VITALS
DIASTOLIC BLOOD PRESSURE: 81 MMHG | BODY MASS INDEX: 24.44 KG/M2 | WEIGHT: 165 LBS | HEART RATE: 83 BPM | HEIGHT: 69 IN | TEMPERATURE: 97.6 F | SYSTOLIC BLOOD PRESSURE: 138 MMHG

## 2024-04-01 DIAGNOSIS — H90.3 SENSORINEURAL HEARING LOSS, BILATERAL: ICD-10-CM

## 2024-04-01 DIAGNOSIS — H93.13 TINNITUS OF BOTH EARS: ICD-10-CM

## 2024-04-01 DIAGNOSIS — R26.89 IMBALANCE: ICD-10-CM

## 2024-04-01 DIAGNOSIS — R42 DIZZINESS: Primary | ICD-10-CM

## 2024-04-01 DIAGNOSIS — R42 VERTIGO: Primary | ICD-10-CM

## 2024-04-01 DIAGNOSIS — H90.3 SENSORINEURAL HEARING LOSS (SNHL) OF BOTH EARS: ICD-10-CM

## 2024-04-01 PROCEDURE — 1159F MED LIST DOCD IN RCRD: CPT | Performed by: NURSE PRACTITIONER

## 2024-04-01 PROCEDURE — 99203 OFFICE O/P NEW LOW 30 MIN: CPT | Performed by: NURSE PRACTITIONER

## 2024-04-01 PROCEDURE — 1036F TOBACCO NON-USER: CPT | Performed by: NURSE PRACTITIONER

## 2024-04-01 PROCEDURE — 1126F AMNT PAIN NOTED NONE PRSNT: CPT | Performed by: NURSE PRACTITIONER

## 2024-04-01 PROCEDURE — 92567 TYMPANOMETRY: CPT

## 2024-04-01 PROCEDURE — 3079F DIAST BP 80-89 MM HG: CPT | Performed by: NURSE PRACTITIONER

## 2024-04-01 PROCEDURE — 3075F SYST BP GE 130 - 139MM HG: CPT | Performed by: NURSE PRACTITIONER

## 2024-04-01 PROCEDURE — 92557 COMPREHENSIVE HEARING TEST: CPT

## 2024-04-01 PROCEDURE — 99213 OFFICE O/P EST LOW 20 MIN: CPT | Performed by: NURSE PRACTITIONER

## 2024-04-01 SDOH — ECONOMIC STABILITY: FOOD INSECURITY: WITHIN THE PAST 12 MONTHS, YOU WORRIED THAT YOUR FOOD WOULD RUN OUT BEFORE YOU GOT MONEY TO BUY MORE.: NEVER TRUE

## 2024-04-01 SDOH — ECONOMIC STABILITY: FOOD INSECURITY: WITHIN THE PAST 12 MONTHS, THE FOOD YOU BOUGHT JUST DIDN'T LAST AND YOU DIDN'T HAVE MONEY TO GET MORE.: NEVER TRUE

## 2024-04-01 ASSESSMENT — COLUMBIA-SUICIDE SEVERITY RATING SCALE - C-SSRS
6. HAVE YOU EVER DONE ANYTHING, STARTED TO DO ANYTHING, OR PREPARED TO DO ANYTHING TO END YOUR LIFE?: NO
1. IN THE PAST MONTH, HAVE YOU WISHED YOU WERE DEAD OR WISHED YOU COULD GO TO SLEEP AND NOT WAKE UP?: NO
2. HAVE YOU ACTUALLY HAD ANY THOUGHTS OF KILLING YOURSELF?: NO

## 2024-04-01 ASSESSMENT — ENCOUNTER SYMPTOMS
DEPRESSION: 0
OCCASIONAL FEELINGS OF UNSTEADINESS: 0
LOSS OF SENSATION IN FEET: 0

## 2024-04-01 ASSESSMENT — PAIN SCALES - GENERAL
PAINLEVEL: 0-NO PAIN
PAINLEVEL_OUTOF10: 0 - NO PAIN

## 2024-04-01 ASSESSMENT — PATIENT HEALTH QUESTIONNAIRE - PHQ9
1. LITTLE INTEREST OR PLEASURE IN DOING THINGS: NOT AT ALL
SUM OF ALL RESPONSES TO PHQ9 QUESTIONS 1 AND 2: 0
2. FEELING DOWN, DEPRESSED OR HOPELESS: NOT AT ALL

## 2024-04-01 ASSESSMENT — PAIN - FUNCTIONAL ASSESSMENT: PAIN_FUNCTIONAL_ASSESSMENT: 0-10

## 2024-04-01 NOTE — PROGRESS NOTES
Subjective   Patient ID: Camryn Garcia is a 83 y.o. male who presents for Vertigo.    HPI  About 3 weeks ago he lost his balance all of the sudden. He was having a lot of spinning. He almost fell, but didn't. He went to the ED. At first, was concerned for a stroke but imaging was negative and was diagnosed with vertigo.   Now the dizziness has resolved, but he feels he is not walking normal. Much improvement though. No sickness prior to symptoms. No headaches. He was having double vision then, but none now. Bright lights and loud sounds don't make him feel dizzy-except if he coughs really hard. No autophony.   Initially he felt he lost some hearing but it got better. He has had tinnitus for years, felt it worsened some after the vertigo. No ear pain or drainage. No previous surgery. He does admit to to loud noise exposure. He does wear hearing aids bilaterally for the last 2-3 years.     Patient Active Problem List   Diagnosis    Vertigo    Vestibular neuronitis of both ears    Anemia    Asthma    Benign prostatic hyperplasia    Bronchitis    Knee pain    Bronchospasm    Disorder    Diverticular disease    Polyp of gallbladder    Gout    Personal history of other endocrine, nutritional and metabolic disease    History of total bilateral knee replacement    Left inguinal hernia    Mild intermittent asthma without complication    Seasonal allergic rhinitis    Sensorineural hearing loss (SNHL) of both ears    Spondylosis of lumbar spine    Beta thalassemia trait    Tremor of right hand    Acquired hypothyroidism    TSH (thyroid-stimulating hormone deficiency)    Vestibular neuronitis    HTN, goal below 130/80    Hyperglycemia, unspecified     Past Surgical History:   Procedure Laterality Date    OTHER SURGICAL HISTORY  12/10/2021    Ventral hernia repair    OTHER SURGICAL HISTORY  12/10/2021    Knee surgery     Review of Systems    All other systems have been reviewed and are negative for complaints except for those  mentioned in history of present illness, past medical history and problem list.    Objective   Physical Exam    Constitutional: No fever, chills, weight loss or weight gain  General appearance: Appears well, well-nourished, well groomed. No acute distress.    Communication: Normal communication    Psychiatric: Oriented to person, place and time. Normal mood and affect.    Neurologic: Cranial nerves II-XII grossly intact and symmetric bilaterally.    Head and Face:  Head: Atraumatic with no masses, lesions or scarring.  Face: Normal symmetry. No scars or deformities.  TMJ: Normal, no trismus.    Eyes: Conjunctiva not edematous or erythematous. PERRLA    Right Ear: External inspection of ear with no deformity, scars, or masses. EAC is clear.  TM is intact with no sign of infection, effusion, or retraction.  No perforation seen.     Left Ear: External inspection of ear with no deformity, scars, or masses. EAC is clear.  TM is intact with no sign of infection, effusion, or retraction.  No perforation seen.     Wearing bilateral hearing aids.     Nose: External inspection of nose: No nasal lesions, lacerations or scars. Anterior rhinoscopy with limited visualization past the inferior turbinates. No tenderness on frontal or maxillary sinus palpation.    Oral Cavity/Mouth: Oral cavity and oropharynx mucosa moist and pink. No lesions or masses. Dentition normal. Tonsils appear normal. Uvula is midline. Tongue with no masses or lesions. Tongue with good mobility. The oropharynx is clear.    Neck: Normal appearing, symmetric, trachea midline.     Cardiovascular: Examination of peripheral vascular system shows no clubbing or cyanosis.    Respiratory: No respiratory distress increased work of breathing. Inspection of the chest with symmetric chest expansion and normal respiratory effort.    Skin: No head and neck rashes.    Lymph nodes: No adenopathy.     On vestibular exam, oculomotor function assessment shows slightly  abnormal ocular pursuits and saccades, some saccadic intrusions. There is no spontaneous nystagmus. VOR intact. Head Thrust test is negative bilaterally.  Postural testing performed. Romberg is negative for any obvious weakness/sway. Tandem Gait is negative for any obvious weakness/sway.     Reviewed recent MRI and CT report.  Does show some chronic sinus disease.  Reviewed PT notes.     I personally interpreted audiogram from today which shows mild sloping to severe SNHL. Type A tympanograms. Good WRS of 84% at 85 dB bilaterally.     Assessment/Plan   Diagnoses and all orders for this visit:  Vertigo  Imbalance  Sensorineural hearing loss (SNHL) of both ears  Tinnitus of both ears    The physiology of balance control was explained. The likely possible etiologies were reviewed and the patient was thoroughly evaluated for BPPV, vestibular neuritis/labyrinthitis, vestibular hypofunction, Menieres Disease, and SCCD. I believe the patient does have a peripheral vestibular disorder. Symptoms are consistent with a vestibular neuritis. We discussed that imbalance can remain over the next 3 months but should continue to improve. I recommended continuing vestibular therapy to treat the imbalance and his BPPV.   Repeat audiogram annually to monitor hearing.   Follow up if symptoms return or do not continue to improve and we can consider VNG testing.  All questions answered to patient satisfaction.         HEATHER Chan-CNP 04/01/24 9:20 AM

## 2024-04-01 NOTE — PATIENT INSTRUCTIONS
IMPRESSIONS     Mild sloping to severe sensorineural hearing loss in both ears.  Word understanding in quiet is good in both ears.  Tympanometry indicates normal middle ear pressure and tympanic membrane mobility in both ears.    Today's test results are hearing loss requiring medical/otologic and audiologic follow-up.     Amplification needs: Continue full-time use of devices, expect when activities preclude device safety.    RECOMMENDATIONS     Continue medical follow up with PCP/ENT as recommended.  Return for audiologic evaluation in conjunction with medical management to monitor hearing sensitivity and assess middle ear status, or sooner should concerns arise.  Avoid exposure to loud sounds by moving away from the noise, turning down the volume, or wearing proper hearing protection correctly.  Consider use of good communication strategies. These include but are not limited to the following: get Harasukh's attention before speaking to him, close the distance between Harasukh and who is speaking, limit background noise, allow Harasukh access to visual cues (i.e. facial expressions/mouth movements, pictures, written instructions, etc.). When in situations where background noise cannot be avoided, position yourself so that the background noise is to your back, and you communication partner is seated in front of you, ideally with a quiet area or wall behind them.

## 2024-04-03 ENCOUNTER — TREATMENT (OUTPATIENT)
Dept: PHYSICAL THERAPY | Facility: CLINIC | Age: 84
End: 2024-04-03
Payer: MEDICARE

## 2024-04-03 DIAGNOSIS — H81.20 VESTIBULAR NEURONITIS, UNSPECIFIED LATERALITY: Primary | ICD-10-CM

## 2024-04-03 PROCEDURE — 97112 NEUROMUSCULAR REEDUCATION: CPT | Mod: GP

## 2024-04-03 PROCEDURE — 97530 THERAPEUTIC ACTIVITIES: CPT | Mod: GP

## 2024-04-03 NOTE — PROGRESS NOTES
Physical Therapy    Physical Therapy Treatment     Patient Name: Camryn Garcia  MRN: [id number]  Today's Date: 4/3/2024  Time Calculation  Start Time: 1045  Stop Time: 1125  Time Calculation (min): 40 min  Insurance:  Visit number: 3 (updated 04/03/24)   Insurance Type: Payor: MEDICARE / Plan: MEDICARE PART A AND B / Product Type: *No Product type* /   Certification Period Start Date: 03/15/24  Certification Period End Date: 06/13/24  Referred by: Magalis Jones APRN-*     Assessment:  Camryn Garcia presents to PT treatment session with improving dizziness. Negative tests for positional vertigo today consistent with likely resolving BPPV.  Issued/updated HEP to address imbalance verbalized in subjective.  Pt most challenged with EC on compliant surface requiring UE A to stabilize due to LOB.  Camryn's  response to tx: improved balance and understanding of condition.   Camryn's Progress towards goals: addressed imbalance and improving dizziness.  Justification for skilled care: Camryn Garcia is at increased fall risk based on modCTSIB score and continues to have intermittent dizziness and imbalance limiting participation in ADLs, IADLs, and meaningful activities. Further skilled therapy services are warranted to decrease fall risk and to address the remaining impairments in order to progress towards functional goals for the Camryn  to fully participate in an active lifestyle. Camryn left session with all questions answered and no increase in symptoms.       Plan:    Monitor for return of BPPV, progress balance as able.    Therapy Diagnosis:   1. Vestibular neuronitis, unspecified laterality  Follow Up In Physical Therapy          Subjective    Camryn Garcia denies any falls or complications since last session. Camryn Garcia reports that he is improving, just some symptoms with quick head turns.  Also reports some imbalance with mobility.     Compliance with HEP: not applicable as no HEP  "issued    Pain:  0/10    Anna Marie Husain, CAITLYN, participated during session.  Elvia Cai, PT, DPT, NCS directly supervised during session.       Precautions:  Fall Risk: None   Denies: CA, pacemaker, DM, blood thinner medication use, latex allergy, epilepsy/seizures, or other known cardio/neuro/pulmonary problems   +anemia, thalassemia minor   +hypothyroidism   +essential tremors  +HTN  +Asthma  Previous surgeries include: B TKR - about 3 years ago     Objective   baseline  /78 mmHg  HR 77 bpm    Oculomotor Exam: with goggles  Spontaneous Nystagmus = negative  Gaze Evoked Nystagmus = negative    Positional Testing: with goggles (multiple trials on the left)  Hume-Hallpike Right = negative   Hume-Hallpike Left = negative   Horizontal Roll Test Right = negative   Horizontal Roll Test Left = negative    Modified Clinical Test of Sensory Interaction in Balance  EO on land = 30 seconds  EC on land = 30 seconds  EO on airex = 30 seconds  EC on airex = 4, 3, 5 seconds    Treatments:  Therapeutic Activity: 20 minutes  -Answered Camryn Garcia's questions in full.  -Reviewed relevant anatomy  and pathophysiology of BPPV using model, rationale for CRP.  -Time spent on positional testing for multiple canals, multiple trials and assessing symptoms.  -Risk factors associated with INC prevalence of BPPV, possibly that it can return.  -Camryn Garcia  educated in balance system and components, rationale for ex and to perform near stable surface for safety.     Neuromuscular Re-education: 20 minutes   NBOS on land with EO  Partial tandem on land with EO  NBOS (feet @ 1 inch apart) on land with EC  Standing marches without UE A  1/4 turns  FWD/BKWD walking near plinth (counter for home)  Side stepping near plinth (counter for home)  Step taps on 6\" step    Assigned HEP: [employer] Access Code: [email or web address]  - Standing Romberg to 1/4 Tandem Stance  - 2 x daily - 7 x weekly - 3 sets - 30 sec hold  - Standing Balance in Corner with " Eyes Closed  - 2 x daily - 7 x weekly - 3 sets - 30 sec hold  - Backward Walking with Counter Support  - 1-2 x daily - 7 x weekly - 10 reps  - Alternating Step Taps with Counter Support  - 1-2 x daily - 7 x weekly - 1-2 sets - 10 reps  - Standing March with Unilateral Counter Support  - 1-2 x daily - 7 x weekly - 1-2 sets - 10 reps    EDUCATION: see above     Time Calculation  Start Time: 1045  Stop Time: 1125  Time Calculation (min): 40 min     PT Therapeutic Procedures Time Entry  Neuromuscular Re-Education Time Entry: 20  Therapeutic Activity Time Entry: 20

## 2024-04-08 NOTE — PROGRESS NOTES
Physical Therapy    Physical Therapy Treatment     Patient Name: Camryn Garcia  MRN: [id number]  Today's Date: 4/10/2024  Time Calculation  Start Time: 1235  Stop Time: 1313  Time Calculation (min): 38 min  Insurance:  Visit number: 4 (updated 04/10/24)   Insurance Type: Payor: MEDICARE / Plan: MEDICARE PART A AND B / Product Type: *No Product type* /   Certification Period Start Date: 03/15/24  Certification Period End Date: 06/13/24  Referred by: Magalis Jones APRN-*     Assessment:  Progress towards goals: Dizziness continues to remain abolished. Continued to assess balance today with pt scoring well on TUG and 5x STS with no fall risk reflected in these tests. However, mild fall risk observed with FGA testing and pt educated regarding this matter. HEP updated to safe but appropriate challenge. Pt able to completed all intervention with CGA to SBA.   Pt response to tx: no INC in pain levels and no dizziness. Good understanding of HEP   Justification for skilled care: Further skilled therapy services are warranted to decrease fall risk and to address the remaining impairments in order to progress towards functional goals for the Camryn  to fully participate in an active lifestyle. Camryn left session with all questions answered and no increase in symptoms.       Plan:  Pt to return to treatment in about 1 month per his request to have time to work on HEP  Monitor for return of BPPV, progress balance as able/Assess response to HEP updates      Therapy Diagnosis:   1. Vestibular neuronitis, unspecified laterality  Follow Up In Physical Therapy    Follow Up In Physical Therapy        Subjective    Camryn Garcia denies any falls or complications since last session.   Reports his dizziness is completely gone. Still feels off balance when walking at times both at home and in community. Notices it more the faster her walks.     Compliance with HEP: not applicable as no HEP issued    Pain:   0/10      Precautions:  Fall Risk: None   Denies: CA, pacemaker, DM, blood thinner medication use, latex allergy, epilepsy/seizures, or other known cardio/neuro/pulmonary problems   +anemia, thalassemia minor   +hypothyroidism   +essential tremors  +HTN  +Asthma  Previous surgeries include: B TKR - about 3 years ago     Objective   baseline  /68 mmHg  HR 64 bpm        Treatments:  Therapeutic Activity: 23 minutes  Balance assessment completed:   5x STS = 14 seconds, no Ues   TUG = 10 seconds no device, SBA   FGA - Functional Gait Assessment  Gait level surface: 2  Change in gait speed: 3  Gait with horizontal head turns: 2  Gait with vertical head turns: 3  Gait and pivot turn: 2  Step over obstacle: 3  Gait with narrow base of support: 2  Gait with eyes closed: 1  Ambulating backwards: 2  Steps: 2  FGA Total Score: 22           Neuromuscular Re-education: 15 minutes   - Standing Romberg to 1/4 Tandem Stance with EC  x 30 seconds each LE leading - HEP   - Backward Walking without Counter Support  - HEP   - Standing Romberg to 3/4 Tandem Stance EO even surface x 30 seconds each LE leading - HEP  - Standing Quarter Turn without Counter Support  x 10-12 reps each direction - HEP   - Walking with Head Rotation with light counter support  - HEP   Verbal review only  - Alternating Step Taps with Counter Support  - 1-2 x daily - 7 x weekly - 1-2 sets - 10 reps  - Standing March with Unilateral Counter Support  - 1-2 x daily - 7 x weekly - 1-2 sets - 10 reps    Assigned HEP: [employer] Access Code: [email or web address]      EDUCATION: see above     Time Calculation  Start Time: 1235  Stop Time: 1313  Time Calculation (min): 38 min     PT Therapeutic Procedures Time Entry  Neuromuscular Re-Education Time Entry: 15  Therapeutic Activity Time Entry: 23

## 2024-04-10 ENCOUNTER — TREATMENT (OUTPATIENT)
Dept: PHYSICAL THERAPY | Facility: CLINIC | Age: 84
End: 2024-04-10
Payer: MEDICARE

## 2024-04-10 DIAGNOSIS — H81.20 VESTIBULAR NEURONITIS, UNSPECIFIED LATERALITY: Primary | ICD-10-CM

## 2024-04-10 PROCEDURE — 97530 THERAPEUTIC ACTIVITIES: CPT | Mod: GP

## 2024-04-10 PROCEDURE — 97112 NEUROMUSCULAR REEDUCATION: CPT | Mod: GP

## 2024-05-15 ENCOUNTER — APPOINTMENT (OUTPATIENT)
Dept: PHYSICAL THERAPY | Facility: CLINIC | Age: 84
End: 2024-05-15
Payer: MEDICARE

## 2024-05-16 ENCOUNTER — DOCUMENTATION (OUTPATIENT)
Dept: PHYSICAL THERAPY | Facility: CLINIC | Age: 84
End: 2024-05-16
Payer: MEDICARE

## 2024-05-16 NOTE — PROGRESS NOTES
Physical Therapy    Discharge Summary    Name: Camryn Garcia  MRN: [id number]  : 1940  Date: 24    Discharge Summary: PT    Discharge Information: Pt no showed to last scheduled session. He was called for follow up and he reports he has been feeling good and does not think he needs more therapy at this time. Pt to be discharged from PT services and back to care under referring physician. He voiced agreement and satisfaction with this plan.

## 2024-09-24 ENCOUNTER — OFFICE VISIT (OUTPATIENT)
Dept: HEMATOLOGY/ONCOLOGY | Facility: CLINIC | Age: 84
End: 2024-09-24
Payer: MEDICARE

## 2024-09-24 ENCOUNTER — LAB (OUTPATIENT)
Dept: LAB | Facility: CLINIC | Age: 84
End: 2024-09-24
Payer: MEDICARE

## 2024-09-24 VITALS
TEMPERATURE: 97.5 F | BODY MASS INDEX: 24.18 KG/M2 | DIASTOLIC BLOOD PRESSURE: 71 MMHG | RESPIRATION RATE: 20 BRPM | HEART RATE: 72 BPM | SYSTOLIC BLOOD PRESSURE: 111 MMHG | OXYGEN SATURATION: 95 % | WEIGHT: 161.38 LBS

## 2024-09-24 DIAGNOSIS — D50.9 IRON DEFICIENCY ANEMIA, UNSPECIFIED IRON DEFICIENCY ANEMIA TYPE: ICD-10-CM

## 2024-09-24 LAB
ALBUMIN SERPL BCP-MCNC: 4.4 G/DL (ref 3.4–5)
ALP SERPL-CCNC: 72 U/L (ref 33–136)
ALT SERPL W P-5'-P-CCNC: 15 U/L (ref 10–52)
ANION GAP SERPL CALC-SCNC: 10 MMOL/L (ref 10–20)
AST SERPL W P-5'-P-CCNC: 17 U/L (ref 9–39)
BASOPHILS # BLD MANUAL: 0.26 X10*3/UL (ref 0–0.1)
BASOPHILS NFR BLD MANUAL: 3 %
BILIRUB SERPL-MCNC: 0.5 MG/DL (ref 0–1.2)
BUN SERPL-MCNC: 13 MG/DL (ref 6–23)
BURR CELLS BLD QL SMEAR: ABNORMAL
CALCIUM SERPL-MCNC: 9.3 MG/DL (ref 8.6–10.3)
CHLORIDE SERPL-SCNC: 101 MMOL/L (ref 98–107)
CO2 SERPL-SCNC: 29 MMOL/L (ref 21–32)
CREAT SERPL-MCNC: 0.99 MG/DL (ref 0.5–1.3)
EGFRCR SERPLBLD CKD-EPI 2021: 76 ML/MIN/1.73M*2
EOSINOPHIL # BLD MANUAL: 0.85 X10*3/UL (ref 0–0.4)
EOSINOPHIL NFR BLD MANUAL: 10 %
ERYTHROCYTE [DISTWIDTH] IN BLOOD BY AUTOMATED COUNT: 16.2 % (ref 11.5–14.5)
FERRITIN SERPL-MCNC: 94 NG/ML (ref 20–300)
GIANT PLATELETS BLD QL SMEAR: ABNORMAL
GLUCOSE SERPL-MCNC: 95 MG/DL (ref 74–99)
HCT VFR BLD AUTO: 40 % (ref 41–52)
HGB BLD-MCNC: 12.7 G/DL (ref 13.5–17.5)
IMM GRANULOCYTES # BLD AUTO: 0.06 X10*3/UL (ref 0–0.5)
IMM GRANULOCYTES NFR BLD AUTO: 0.7 % (ref 0–0.9)
IRON SATN MFR SERPL: 26 % (ref 25–45)
IRON SERPL-MCNC: 99 UG/DL (ref 35–150)
LYMPHOCYTES # BLD MANUAL: 2.04 X10*3/UL (ref 0.8–3)
LYMPHOCYTES NFR BLD MANUAL: 24 %
MCH RBC QN AUTO: 21.9 PG (ref 26–34)
MCHC RBC AUTO-ENTMCNC: 31.8 G/DL (ref 32–36)
MCV RBC AUTO: 69 FL (ref 80–100)
MONOCYTES # BLD MANUAL: 0.85 X10*3/UL (ref 0.05–0.8)
MONOCYTES NFR BLD MANUAL: 10 %
NEUTROPHILS # BLD MANUAL: 4.43 X10*3/UL (ref 1.6–5.5)
NEUTS BAND # BLD MANUAL: 0.09 X10*3/UL (ref 0–0.5)
NEUTS BAND NFR BLD MANUAL: 1 %
NEUTS SEG # BLD MANUAL: 4.34 X10*3/UL (ref 1.6–5)
NEUTS SEG NFR BLD MANUAL: 51 %
NRBC BLD-RTO: 0 /100 WBCS (ref 0–0)
OVALOCYTES BLD QL SMEAR: ABNORMAL
PLATELET # BLD AUTO: 175 X10*3/UL (ref 150–450)
POTASSIUM SERPL-SCNC: 4.2 MMOL/L (ref 3.5–5.3)
PROT SERPL-MCNC: 7.3 G/DL (ref 6.4–8.2)
RBC # BLD AUTO: 5.81 X10*6/UL (ref 4.5–5.9)
RBC MORPH BLD: ABNORMAL
SODIUM SERPL-SCNC: 136 MMOL/L (ref 136–145)
TARGETS BLD QL SMEAR: ABNORMAL
TIBC SERPL-MCNC: 387 UG/DL (ref 240–445)
TOTAL CELLS COUNTED BLD: 100
UIBC SERPL-MCNC: 288 UG/DL (ref 110–370)
VARIANT LYMPHS # BLD MANUAL: 0.09 X10*3/UL (ref 0–0.3)
VARIANT LYMPHS NFR BLD: 1 %
WBC # BLD AUTO: 8.5 X10*3/UL (ref 4.4–11.3)

## 2024-09-24 PROCEDURE — 1159F MED LIST DOCD IN RCRD: CPT | Performed by: INTERNAL MEDICINE

## 2024-09-24 PROCEDURE — 3074F SYST BP LT 130 MM HG: CPT | Performed by: INTERNAL MEDICINE

## 2024-09-24 PROCEDURE — 85027 COMPLETE CBC AUTOMATED: CPT

## 2024-09-24 PROCEDURE — 3078F DIAST BP <80 MM HG: CPT | Performed by: INTERNAL MEDICINE

## 2024-09-24 PROCEDURE — 80053 COMPREHEN METABOLIC PANEL: CPT

## 2024-09-24 PROCEDURE — 83540 ASSAY OF IRON: CPT

## 2024-09-24 PROCEDURE — 82728 ASSAY OF FERRITIN: CPT | Mod: PARLAB

## 2024-09-24 PROCEDURE — 99213 OFFICE O/P EST LOW 20 MIN: CPT | Performed by: INTERNAL MEDICINE

## 2024-09-24 PROCEDURE — 36415 COLL VENOUS BLD VENIPUNCTURE: CPT

## 2024-09-24 PROCEDURE — 1126F AMNT PAIN NOTED NONE PRSNT: CPT | Performed by: INTERNAL MEDICINE

## 2024-09-24 PROCEDURE — 85007 BL SMEAR W/DIFF WBC COUNT: CPT

## 2024-09-24 ASSESSMENT — PAIN SCALES - GENERAL: PAINLEVEL: 0-NO PAIN

## 2024-09-24 NOTE — PROGRESS NOTES
LOCATION:  [facility] at [facility].     HEMATOLOGY & ONCOLOGY PROBLEMS:  1. Microcytic anemia.      a. Beta thalassemia minor      b. Iron deficiency. S/p IV iron infusion in April 2018     CHIEF COMPLAINT:    The patient is in the clinic today for management of microcytic anemia.                   HISTORY:   Mr. Garcia is an pleasant 84-year-old [place] gentleman with finding of microcytic anemia. As per the patient he has always been known to have a mild chronic anemia but he has been asymptomatic all his life. Around early 2018 he started having problem with  the worsening weakness, fatigue and lower extremity leg pain. CBC done  by Dr. Siu revealed a hemoglobin of 11.8 on 2/12/18. White cell count and platelets are normal and MCV was 71.4.  Extensive other workup  revealed low percentage iron saturation  of 6 with elevated TIBC of 514. Vitamin B12, folate, and metabolic profile etc. were all normal. No history of problem with the melena or hematochezia. He had a normal colonoscopy in 2015. After initial hematological evaluation in March 2013 he was confirmed  to have beta thalassemia minor along with the significant iron deficiency. SPEP, TSH, LDH, haptoglobin etc were all normal. He was treated with a course of IV iron infusion in April 2018 with good response.     INTERVAL HISTORY:  Patient denies any new complaints other than chronic weakness.  Overall feeling better with significant improvement in his knee pain and discomfort after his bilateral knee surgeries.  He also had a left inguinal hernia surgery done last year.  No history of nausea, vomiting, fever, night sweats, diarrhea, rash, anorexia or weight loss.      PAST MEDICAL HISTORY:   1. Anemia.  2. Hypothyroidism.  3. Gout.  4. Asthma.  5. History of benign essential tremors.  6. History of abdominal hernia surgery.  7. History of right shoulder tendinitis status post cortisone injection in 2016.  8. Arthritis.      SOCIAL HISTORY:     and lives in [place]. Quit smoking around . Half pack for 50 years prior smoking history. Rare social alcohol intake. He is a retired . Born in [place] but moved to [place] in 1960s and then to  in .     FAMILY HISTORY:    Father  at age 76 from myocardial infarction. Mother  from natural causes in her 90s. 2 siblings, 2 children and one grandchildren alive and well. His son had a history of childhood optical glioma and was treated with surgery and radiation as  a child at [facility] and now is legally blind. No other specific history of bleeding, clotting or malignant disorder in the family.     REVIEW OF SYSTEMS:  Pertinent finding as per the history above. All other systems have been reviewed and generally negative and noncontributory.     ALLERGY & MEDICATIONS:  Allergies and latest outpatient medications list were reviewed in the EMR.    VITAL SIGNS  BSA: 1.88 meters squared  /71   Pulse 72   Temp 36.4 °C (97.5 °F)   Resp 20   Wt 73.2 kg (161 lb 6 oz)   SpO2 95%   BMI 24.18 kg/m²     PHYSICAL EXAMINATION:  Detailed examination not done.    LAB RESULTS:  CBC from today showed a hemoglobin of 12.7 with MCV of 69.  WBC, platelets, metabolic profile and iron panel are all normal.  Ferritin is pending.  Last 3 sets of blood work reviewed and trend was noted.     ASSESSMENT & PLAN:  1. Microcytic anemia. Multifactorial due to baseline history of beta thalassemia minor along with superadded iron deficiency. Clinically he was having problem  with worsening weakness and fatigue. No GI symptoms and he had a normal colonoscopy in . After initial hematological evaluation in 2018 he was confirmed to have beta thalassemia minor along with the significant iron deficiency. SPEP, TSH, LDH,  haptoglobin etc were all normal. He was supported with a course of IV iron infusion with good response.     Latest blood work results are overall stable.  Hemoglobin is 12.7 and iron stores are  all normal.  In view of baseline beta thalassemia minor trait, most likely this is his normal hemoglobin level.  No immediate need for additional iron infusional support at this  time.  As far as etiology for iron deficiency is concerned, most likely, he has poor oral iron absorption and may need intermittent IV iron infusion support. We will continue to monitor him closely with regular blood work.      2. Follow up:  Patient will return to the clinic in 12 months.  Advised to contact us immediately if there are any new questions or problems.     This note has been transcribed using Dragon voice recognition system and there is a possibility of unintentional typing misprints.

## 2025-06-19 ENCOUNTER — HOSPITAL ENCOUNTER (OUTPATIENT)
Dept: RADIOLOGY | Facility: HOSPITAL | Age: 85
Discharge: HOME | End: 2025-06-19
Payer: MEDICARE

## 2025-06-19 DIAGNOSIS — R05.9 COUGH: ICD-10-CM

## 2025-06-19 PROCEDURE — 71046 X-RAY EXAM CHEST 2 VIEWS: CPT

## 2025-06-19 PROCEDURE — 71046 X-RAY EXAM CHEST 2 VIEWS: CPT | Performed by: RADIOLOGY
